# Patient Record
Sex: MALE | Race: WHITE | Employment: FULL TIME | URBAN - NONMETROPOLITAN AREA
[De-identification: names, ages, dates, MRNs, and addresses within clinical notes are randomized per-mention and may not be internally consistent; named-entity substitution may affect disease eponyms.]

---

## 2017-03-13 ENCOUNTER — OFFICE VISIT (OUTPATIENT)
Dept: FAMILY MEDICINE CLINIC | Age: 52
End: 2017-03-13

## 2017-03-13 VITALS
HEART RATE: 71 BPM | OXYGEN SATURATION: 96 % | WEIGHT: 226 LBS | DIASTOLIC BLOOD PRESSURE: 80 MMHG | TEMPERATURE: 97.2 F | BODY MASS INDEX: 28.1 KG/M2 | SYSTOLIC BLOOD PRESSURE: 122 MMHG | HEIGHT: 75 IN

## 2017-03-13 DIAGNOSIS — H10.32 ACUTE BACTERIAL CONJUNCTIVITIS OF LEFT EYE: ICD-10-CM

## 2017-03-13 DIAGNOSIS — Z12.11 COLON CANCER SCREENING: ICD-10-CM

## 2017-03-13 DIAGNOSIS — Z00.00 ENCOUNTER FOR PREVENTIVE HEALTH EXAMINATION: ICD-10-CM

## 2017-03-13 DIAGNOSIS — J06.9 UPPER RESPIRATORY TRACT INFECTION, UNSPECIFIED TYPE: Primary | ICD-10-CM

## 2017-03-13 DIAGNOSIS — Z13.220 LIPID SCREENING: ICD-10-CM

## 2017-03-13 DIAGNOSIS — Z12.5 PROSTATE CANCER SCREENING: ICD-10-CM

## 2017-03-13 PROCEDURE — 99203 OFFICE O/P NEW LOW 30 MIN: CPT | Performed by: NURSE PRACTITIONER

## 2017-03-13 RX ORDER — POLYMYXIN B SULFATE AND TRIMETHOPRIM 1; 10000 MG/ML; [USP'U]/ML
1 SOLUTION OPHTHALMIC EVERY 6 HOURS
Qty: 1 BOTTLE | Refills: 0 | Status: SHIPPED | OUTPATIENT
Start: 2017-03-13 | End: 2017-03-23

## 2017-03-13 RX ORDER — AZITHROMYCIN 250 MG/1
TABLET, FILM COATED ORAL
Qty: 1 PACKET | Refills: 0 | Status: ON HOLD | OUTPATIENT
Start: 2017-03-13 | End: 2017-05-15 | Stop reason: ALTCHOICE

## 2017-03-13 ASSESSMENT — ENCOUNTER SYMPTOMS
EYE DISCHARGE: 1
SHORTNESS OF BREATH: 0
EYE ITCHING: 0
SORE THROAT: 1
EYE REDNESS: 1
EYE PAIN: 0
COUGH: 1
PHOTOPHOBIA: 0

## 2017-04-07 DIAGNOSIS — Z00.00 ENCOUNTER FOR PREVENTIVE HEALTH EXAMINATION: ICD-10-CM

## 2017-04-07 DIAGNOSIS — Z12.5 PROSTATE CANCER SCREENING: ICD-10-CM

## 2017-04-07 DIAGNOSIS — Z13.220 LIPID SCREENING: ICD-10-CM

## 2017-04-07 LAB
ALBUMIN SERPL-MCNC: 4.4 G/DL (ref 3.9–4.9)
ALP BLD-CCNC: 38 U/L (ref 35–104)
ALT SERPL-CCNC: 36 U/L (ref 0–41)
ANION GAP SERPL CALCULATED.3IONS-SCNC: 12 MEQ/L (ref 7–13)
AST SERPL-CCNC: 25 U/L (ref 0–40)
BILIRUB SERPL-MCNC: 0.7 MG/DL (ref 0–1.2)
BUN BLDV-MCNC: 19 MG/DL (ref 6–20)
CALCIUM SERPL-MCNC: 10 MG/DL (ref 8.6–10.2)
CHLORIDE BLD-SCNC: 103 MEQ/L (ref 98–107)
CHOLESTEROL, TOTAL: 196 MG/DL (ref 0–199)
CO2: 25 MEQ/L (ref 22–29)
CREAT SERPL-MCNC: 0.97 MG/DL (ref 0.7–1.2)
GFR AFRICAN AMERICAN: >60
GFR NON-AFRICAN AMERICAN: >60
GLOBULIN: 2.4 G/DL (ref 2.3–3.5)
GLUCOSE BLD-MCNC: 86 MG/DL (ref 74–109)
HCT VFR BLD CALC: 45.1 % (ref 42–52)
HDLC SERPL-MCNC: 39 MG/DL (ref 40–59)
HEMOGLOBIN: 16 G/DL (ref 14–18)
LDL CHOLESTEROL CALCULATED: 100 MG/DL (ref 0–129)
MCH RBC QN AUTO: 33.5 PG (ref 27–31.3)
MCHC RBC AUTO-ENTMCNC: 35.5 % (ref 33–37)
MCV RBC AUTO: 94.3 FL (ref 80–100)
PDW BLD-RTO: 12.4 % (ref 11.5–14.5)
PLATELET # BLD: 192 K/UL (ref 130–400)
POTASSIUM SERPL-SCNC: 4.8 MEQ/L (ref 3.5–5.1)
PROSTATE SPECIFIC ANTIGEN: 1.6 NG/ML (ref 0–3.89)
RBC # BLD: 4.78 M/UL (ref 4.7–6.1)
SODIUM BLD-SCNC: 140 MEQ/L (ref 132–144)
TOTAL PROTEIN: 6.8 G/DL (ref 6.4–8.1)
TRIGL SERPL-MCNC: 284 MG/DL (ref 0–200)
WBC # BLD: 6.6 K/UL (ref 4.8–10.8)

## 2017-05-15 ENCOUNTER — HOSPITAL ENCOUNTER (OUTPATIENT)
Age: 52
Setting detail: OUTPATIENT SURGERY
Discharge: HOME | End: 2017-05-15
Attending: INTERNAL MEDICINE | Admitting: INTERNAL MEDICINE
Payer: COMMERCIAL

## 2017-05-15 ENCOUNTER — OFFICE VISIT (OUTPATIENT)
Dept: GASTROENTEROLOGY | Age: 52
End: 2017-05-15

## 2017-05-15 ENCOUNTER — ANESTHESIA EVENT (OUTPATIENT)
Dept: ENDOSCOPY | Age: 52
End: 2017-05-15
Payer: COMMERCIAL

## 2017-05-15 ENCOUNTER — ANESTHESIA (OUTPATIENT)
Dept: ENDOSCOPY | Age: 52
End: 2017-05-15
Payer: COMMERCIAL

## 2017-05-15 VITALS
RESPIRATION RATE: 18 BRPM | DIASTOLIC BLOOD PRESSURE: 75 MMHG | OXYGEN SATURATION: 96 % | SYSTOLIC BLOOD PRESSURE: 127 MMHG

## 2017-05-15 VITALS
DIASTOLIC BLOOD PRESSURE: 75 MMHG | BODY MASS INDEX: 28.23 KG/M2 | WEIGHT: 220 LBS | OXYGEN SATURATION: 64 % | HEIGHT: 74 IN | SYSTOLIC BLOOD PRESSURE: 131 MMHG | RESPIRATION RATE: 18 BRPM | TEMPERATURE: 97.8 F | HEART RATE: 66 BPM

## 2017-05-15 DIAGNOSIS — Z79.899 ENCOUNTER FOR LONG-TERM (CURRENT) USE OF MEDICATIONS: ICD-10-CM

## 2017-05-15 DIAGNOSIS — Z83.71 FAMILY HISTORY OF COLONIC POLYPS: Primary | ICD-10-CM

## 2017-05-15 PROCEDURE — 2500000003 HC RX 250 WO HCPCS: Performed by: NURSE ANESTHETIST, CERTIFIED REGISTERED

## 2017-05-15 PROCEDURE — 7100000011 HC PHASE II RECOVERY - ADDTL 15 MIN: Performed by: INTERNAL MEDICINE

## 2017-05-15 PROCEDURE — 3700000001 HC ADD 15 MINUTES (ANESTHESIA): Performed by: INTERNAL MEDICINE

## 2017-05-15 PROCEDURE — 2580000003 HC RX 258: Performed by: STUDENT IN AN ORGANIZED HEALTH CARE EDUCATION/TRAINING PROGRAM

## 2017-05-15 PROCEDURE — 7100000010 HC PHASE II RECOVERY - FIRST 15 MIN: Performed by: INTERNAL MEDICINE

## 2017-05-15 PROCEDURE — 3700000000 HC ANESTHESIA ATTENDED CARE: Performed by: INTERNAL MEDICINE

## 2017-05-15 PROCEDURE — 3609027000 HC COLONOSCOPY: Performed by: INTERNAL MEDICINE

## 2017-05-15 PROCEDURE — 6360000002 HC RX W HCPCS: Performed by: NURSE ANESTHETIST, CERTIFIED REGISTERED

## 2017-05-15 PROCEDURE — PREOPEXAM PRE-OP EXAM: Performed by: INTERNAL MEDICINE

## 2017-05-15 RX ORDER — SODIUM CHLORIDE 9 MG/ML
INJECTION, SOLUTION INTRAVENOUS CONTINUOUS
Status: DISCONTINUED | OUTPATIENT
Start: 2017-05-15 | End: 2017-05-15 | Stop reason: HOSPADM

## 2017-05-15 RX ORDER — LIDOCAINE HYDROCHLORIDE 20 MG/ML
INJECTION, SOLUTION INFILTRATION; PERINEURAL PRN
Status: DISCONTINUED | OUTPATIENT
Start: 2017-05-15 | End: 2017-05-15 | Stop reason: SDUPTHER

## 2017-05-15 RX ORDER — PROPOFOL 10 MG/ML
INJECTION, EMULSION INTRAVENOUS PRN
Status: DISCONTINUED | OUTPATIENT
Start: 2017-05-15 | End: 2017-05-15 | Stop reason: SDUPTHER

## 2017-05-15 RX ORDER — ONDANSETRON 2 MG/ML
4 INJECTION INTRAMUSCULAR; INTRAVENOUS
Status: DISCONTINUED | OUTPATIENT
Start: 2017-05-15 | End: 2017-05-15 | Stop reason: HOSPADM

## 2017-05-15 RX ADMIN — PROPOFOL 200 MG: 10 INJECTION, EMULSION INTRAVENOUS at 10:12

## 2017-05-15 RX ADMIN — PROPOFOL 100 MG: 10 INJECTION, EMULSION INTRAVENOUS at 10:20

## 2017-05-15 RX ADMIN — LIDOCAINE HYDROCHLORIDE 60 MG: 20 INJECTION, SOLUTION INFILTRATION; PERINEURAL at 10:12

## 2017-05-15 RX ADMIN — SODIUM CHLORIDE: 9 INJECTION, SOLUTION INTRAVENOUS at 09:17

## 2017-05-15 ASSESSMENT — PAIN - FUNCTIONAL ASSESSMENT: PAIN_FUNCTIONAL_ASSESSMENT: 0-10

## 2017-05-15 ASSESSMENT — PAIN DESCRIPTION - DESCRIPTORS: DESCRIPTORS: ACHING

## 2017-05-16 VITALS
HEART RATE: 74 BPM | SYSTOLIC BLOOD PRESSURE: 145 MMHG | WEIGHT: 220 LBS | DIASTOLIC BLOOD PRESSURE: 94 MMHG | BODY MASS INDEX: 28.25 KG/M2

## 2017-08-21 ENCOUNTER — OFFICE VISIT (OUTPATIENT)
Dept: SURGERY | Age: 52
End: 2017-08-21

## 2017-08-21 VITALS
HEIGHT: 75 IN | DIASTOLIC BLOOD PRESSURE: 78 MMHG | WEIGHT: 227 LBS | SYSTOLIC BLOOD PRESSURE: 122 MMHG | BODY MASS INDEX: 28.23 KG/M2 | TEMPERATURE: 97.8 F

## 2017-08-21 DIAGNOSIS — K61.1 PERIRECTAL ABSCESS: Primary | ICD-10-CM

## 2017-08-21 PROCEDURE — 99213 OFFICE O/P EST LOW 20 MIN: CPT | Performed by: SURGERY

## 2017-08-21 RX ORDER — AMOXICILLIN AND CLAVULANATE POTASSIUM 875; 125 MG/1; MG/1
1 TABLET, FILM COATED ORAL 2 TIMES DAILY
Qty: 28 TABLET | Refills: 1 | Status: SHIPPED | OUTPATIENT
Start: 2017-08-21 | End: 2017-09-04

## 2017-08-22 ENCOUNTER — APPOINTMENT (OUTPATIENT)
Dept: GENERAL RADIOLOGY | Age: 52
End: 2017-08-22
Payer: COMMERCIAL

## 2017-08-22 ENCOUNTER — HOSPITAL ENCOUNTER (EMERGENCY)
Age: 52
Discharge: HOME | End: 2017-08-22
Attending: EMERGENCY MEDICINE
Payer: COMMERCIAL

## 2017-08-22 VITALS
RESPIRATION RATE: 16 BRPM | HEART RATE: 85 BPM | OXYGEN SATURATION: 98 % | BODY MASS INDEX: 27.5 KG/M2 | SYSTOLIC BLOOD PRESSURE: 148 MMHG | DIASTOLIC BLOOD PRESSURE: 96 MMHG | WEIGHT: 220 LBS | TEMPERATURE: 98.4 F

## 2017-08-22 DIAGNOSIS — S86.012A ACHILLES TENDON RUPTURE, LEFT, INITIAL ENCOUNTER: Primary | ICD-10-CM

## 2017-08-22 PROCEDURE — 99283 EMERGENCY DEPT VISIT LOW MDM: CPT

## 2017-08-22 PROCEDURE — 73610 X-RAY EXAM OF ANKLE: CPT

## 2017-08-22 PROCEDURE — 6370000000 HC RX 637 (ALT 250 FOR IP): Performed by: EMERGENCY MEDICINE

## 2017-08-22 PROCEDURE — 73630 X-RAY EXAM OF FOOT: CPT

## 2017-08-22 RX ORDER — HYDROCODONE BITARTRATE AND ACETAMINOPHEN 5; 325 MG/1; MG/1
1 TABLET ORAL ONCE
Status: COMPLETED | OUTPATIENT
Start: 2017-08-22 | End: 2017-08-22

## 2017-08-22 RX ORDER — HYDROCODONE BITARTRATE AND ACETAMINOPHEN 5; 325 MG/1; MG/1
1 TABLET ORAL EVERY 6 HOURS PRN
Qty: 10 TABLET | Refills: 0 | Status: ON HOLD | OUTPATIENT
Start: 2017-08-22 | End: 2017-08-26 | Stop reason: HOSPADM

## 2017-08-22 RX ORDER — IBUPROFEN 600 MG/1
600 TABLET ORAL EVERY 6 HOURS PRN
Qty: 30 TABLET | Refills: 0 | Status: SHIPPED | OUTPATIENT
Start: 2017-08-22

## 2017-08-22 RX ADMIN — HYDROCODONE BITARTRATE AND ACETAMINOPHEN 1 TABLET: 5; 325 TABLET ORAL at 08:16

## 2017-08-22 ASSESSMENT — PAIN DESCRIPTION - LOCATION: LOCATION: ANKLE;LEG

## 2017-08-22 ASSESSMENT — PAIN DESCRIPTION - ONSET: ONSET: SUDDEN

## 2017-08-22 ASSESSMENT — ENCOUNTER SYMPTOMS: RESPIRATORY NEGATIVE: 1

## 2017-08-22 ASSESSMENT — PAIN DESCRIPTION - FREQUENCY: FREQUENCY: CONTINUOUS

## 2017-08-22 ASSESSMENT — PAIN DESCRIPTION - ORIENTATION: ORIENTATION: LEFT

## 2017-08-22 ASSESSMENT — PAIN SCALES - GENERAL
PAINLEVEL_OUTOF10: 5
PAINLEVEL_OUTOF10: 5

## 2017-08-22 ASSESSMENT — PAIN DESCRIPTION - DESCRIPTORS: DESCRIPTORS: THROBBING;BURNING

## 2017-08-22 ASSESSMENT — PAIN DESCRIPTION - PAIN TYPE: TYPE: ACUTE PAIN

## 2017-08-25 ENCOUNTER — HOSPITAL ENCOUNTER (OUTPATIENT)
Age: 52
Discharge: HOME | End: 2017-08-26
Attending: ORTHOPAEDIC SURGERY | Admitting: ORTHOPAEDIC SURGERY
Payer: COMMERCIAL

## 2017-08-25 ENCOUNTER — ANESTHESIA EVENT (OUTPATIENT)
Dept: OPERATING ROOM | Age: 52
End: 2017-08-25
Payer: COMMERCIAL

## 2017-08-25 ENCOUNTER — ANESTHESIA (OUTPATIENT)
Dept: OPERATING ROOM | Age: 52
End: 2017-08-25
Payer: COMMERCIAL

## 2017-08-25 VITALS — SYSTOLIC BLOOD PRESSURE: 104 MMHG | TEMPERATURE: 91.2 F | OXYGEN SATURATION: 100 % | DIASTOLIC BLOOD PRESSURE: 56 MMHG

## 2017-08-25 LAB
EKG ATRIAL RATE: 84 BPM
EKG P AXIS: 63 DEGREES
EKG P-R INTERVAL: 176 MS
EKG Q-T INTERVAL: 354 MS
EKG QRS DURATION: 76 MS
EKG QTC CALCULATION (BAZETT): 418 MS
EKG R AXIS: 28 DEGREES
EKG T AXIS: 37 DEGREES
EKG VENTRICULAR RATE: 84 BPM
HCT VFR BLD CALC: 47.3 % (ref 42–52)
HEMOGLOBIN: 16.1 G/DL (ref 14–18)
MCH RBC QN AUTO: 32.1 PG (ref 27–31.3)
MCHC RBC AUTO-ENTMCNC: 34 % (ref 33–37)
MCV RBC AUTO: 94.4 FL (ref 80–100)
PDW BLD-RTO: 12.2 % (ref 11.5–14.5)
PLATELET # BLD: 257 K/UL (ref 130–400)
RBC # BLD: 5.01 M/UL (ref 4.7–6.1)
WBC # BLD: 7.3 K/UL (ref 4.8–10.8)

## 2017-08-25 PROCEDURE — 6360000002 HC RX W HCPCS: Performed by: ORTHOPAEDIC SURGERY

## 2017-08-25 PROCEDURE — 2740000010 HC MISC ORTHOTIC: Performed by: ORTHOPAEDIC SURGERY

## 2017-08-25 PROCEDURE — 2580000003 HC RX 258: Performed by: STUDENT IN AN ORGANIZED HEALTH CARE EDUCATION/TRAINING PROGRAM

## 2017-08-25 PROCEDURE — 6360000002 HC RX W HCPCS: Performed by: ANESTHESIOLOGY

## 2017-08-25 PROCEDURE — 64447 NJX AA&/STRD FEMORAL NRV IMG: CPT | Performed by: NURSE ANESTHETIST, CERTIFIED REGISTERED

## 2017-08-25 PROCEDURE — 6360000002 HC RX W HCPCS: Performed by: NURSE ANESTHETIST, CERTIFIED REGISTERED

## 2017-08-25 PROCEDURE — 6370000000 HC RX 637 (ALT 250 FOR IP): Performed by: ORTHOPAEDIC SURGERY

## 2017-08-25 PROCEDURE — 85027 COMPLETE CBC AUTOMATED: CPT

## 2017-08-25 PROCEDURE — 2580000003 HC RX 258: Performed by: ORTHOPAEDIC SURGERY

## 2017-08-25 PROCEDURE — 7100000001 HC PACU RECOVERY - ADDTL 15 MIN: Performed by: ORTHOPAEDIC SURGERY

## 2017-08-25 PROCEDURE — 3600000003 HC SURGERY LEVEL 3 BASE: Performed by: ORTHOPAEDIC SURGERY

## 2017-08-25 PROCEDURE — 3700000000 HC ANESTHESIA ATTENDED CARE: Performed by: ORTHOPAEDIC SURGERY

## 2017-08-25 PROCEDURE — 3700000001 HC ADD 15 MINUTES (ANESTHESIA): Performed by: ORTHOPAEDIC SURGERY

## 2017-08-25 PROCEDURE — 64445 NJX AA&/STRD SCIATIC NRV IMG: CPT | Performed by: NURSE ANESTHETIST, CERTIFIED REGISTERED

## 2017-08-25 PROCEDURE — 7100000000 HC PACU RECOVERY - FIRST 15 MIN: Performed by: ORTHOPAEDIC SURGERY

## 2017-08-25 PROCEDURE — 3600000013 HC SURGERY LEVEL 3 ADDTL 15MIN: Performed by: ORTHOPAEDIC SURGERY

## 2017-08-25 PROCEDURE — 93005 ELECTROCARDIOGRAM TRACING: CPT

## 2017-08-25 PROCEDURE — 2500000003 HC RX 250 WO HCPCS: Performed by: NURSE ANESTHETIST, CERTIFIED REGISTERED

## 2017-08-25 PROCEDURE — 2500000003 HC RX 250 WO HCPCS: Performed by: STUDENT IN AN ORGANIZED HEALTH CARE EDUCATION/TRAINING PROGRAM

## 2017-08-25 RX ORDER — ACETAMINOPHEN 325 MG/1
650 TABLET ORAL EVERY 4 HOURS PRN
Status: DISCONTINUED | OUTPATIENT
Start: 2017-08-25 | End: 2017-08-26 | Stop reason: HOSPADM

## 2017-08-25 RX ORDER — MORPHINE SULFATE 2 MG/ML
2 INJECTION, SOLUTION INTRAMUSCULAR; INTRAVENOUS
Status: DISCONTINUED | OUTPATIENT
Start: 2017-08-25 | End: 2017-08-26 | Stop reason: HOSPADM

## 2017-08-25 RX ORDER — SODIUM CHLORIDE 0.9 % (FLUSH) 0.9 %
10 SYRINGE (ML) INJECTION PRN
Status: DISCONTINUED | OUTPATIENT
Start: 2017-08-25 | End: 2017-08-25 | Stop reason: HOSPADM

## 2017-08-25 RX ORDER — MAGNESIUM HYDROXIDE 1200 MG/15ML
LIQUID ORAL CONTINUOUS PRN
Status: DISCONTINUED | OUTPATIENT
Start: 2017-08-25 | End: 2017-08-25 | Stop reason: HOSPADM

## 2017-08-25 RX ORDER — ROCURONIUM BROMIDE 10 MG/ML
INJECTION, SOLUTION INTRAVENOUS PRN
Status: DISCONTINUED | OUTPATIENT
Start: 2017-08-25 | End: 2017-08-25 | Stop reason: SDUPTHER

## 2017-08-25 RX ORDER — ROPIVACAINE HYDROCHLORIDE 5 MG/ML
INJECTION, SOLUTION EPIDURAL; INFILTRATION; PERINEURAL PRN
Status: DISCONTINUED | OUTPATIENT
Start: 2017-08-25 | End: 2017-08-25 | Stop reason: SDUPTHER

## 2017-08-25 RX ORDER — HYDROCODONE BITARTRATE AND ACETAMINOPHEN 5; 325 MG/1; MG/1
1 TABLET ORAL PRN
Status: DISCONTINUED | OUTPATIENT
Start: 2017-08-25 | End: 2017-08-25 | Stop reason: HOSPADM

## 2017-08-25 RX ORDER — DEXAMETHASONE SODIUM PHOSPHATE 10 MG/ML
INJECTION INTRAMUSCULAR; INTRAVENOUS PRN
Status: DISCONTINUED | OUTPATIENT
Start: 2017-08-25 | End: 2017-08-25 | Stop reason: SDUPTHER

## 2017-08-25 RX ORDER — METOCLOPRAMIDE HYDROCHLORIDE 5 MG/ML
10 INJECTION INTRAMUSCULAR; INTRAVENOUS
Status: DISCONTINUED | OUTPATIENT
Start: 2017-08-25 | End: 2017-08-25 | Stop reason: HOSPADM

## 2017-08-25 RX ORDER — SODIUM CHLORIDE 0.9 % (FLUSH) 0.9 %
10 SYRINGE (ML) INJECTION EVERY 12 HOURS SCHEDULED
Status: DISCONTINUED | OUTPATIENT
Start: 2017-08-25 | End: 2017-08-25 | Stop reason: HOSPADM

## 2017-08-25 RX ORDER — DOCUSATE SODIUM 100 MG/1
100 CAPSULE, LIQUID FILLED ORAL 2 TIMES DAILY
Status: DISCONTINUED | OUTPATIENT
Start: 2017-08-25 | End: 2017-08-26 | Stop reason: HOSPADM

## 2017-08-25 RX ORDER — MORPHINE SULFATE 1 MG/ML
INJECTION, SOLUTION EPIDURAL; INTRATHECAL; INTRAVENOUS PRN
Status: DISCONTINUED | OUTPATIENT
Start: 2017-08-25 | End: 2017-08-25 | Stop reason: SDUPTHER

## 2017-08-25 RX ORDER — ONDANSETRON 2 MG/ML
INJECTION INTRAMUSCULAR; INTRAVENOUS PRN
Status: DISCONTINUED | OUTPATIENT
Start: 2017-08-25 | End: 2017-08-25 | Stop reason: SDUPTHER

## 2017-08-25 RX ORDER — EPHEDRINE SULFATE 50 MG/ML
INJECTION, SOLUTION INTRAVENOUS PRN
Status: DISCONTINUED | OUTPATIENT
Start: 2017-08-25 | End: 2017-08-25 | Stop reason: SDUPTHER

## 2017-08-25 RX ORDER — OXYCODONE HYDROCHLORIDE AND ACETAMINOPHEN 5; 325 MG/1; MG/1
2 TABLET ORAL EVERY 4 HOURS PRN
Status: DISCONTINUED | OUTPATIENT
Start: 2017-08-25 | End: 2017-08-26 | Stop reason: HOSPADM

## 2017-08-25 RX ORDER — FENTANYL CITRATE 50 UG/ML
50 INJECTION, SOLUTION INTRAMUSCULAR; INTRAVENOUS EVERY 10 MIN PRN
Status: DISCONTINUED | OUTPATIENT
Start: 2017-08-25 | End: 2017-08-25 | Stop reason: HOSPADM

## 2017-08-25 RX ORDER — SENNA AND DOCUSATE SODIUM 50; 8.6 MG/1; MG/1
1 TABLET, FILM COATED ORAL DAILY
Status: DISCONTINUED | OUTPATIENT
Start: 2017-08-25 | End: 2017-08-26 | Stop reason: HOSPADM

## 2017-08-25 RX ORDER — SODIUM CHLORIDE, SODIUM LACTATE, POTASSIUM CHLORIDE, CALCIUM CHLORIDE 600; 310; 30; 20 MG/100ML; MG/100ML; MG/100ML; MG/100ML
INJECTION, SOLUTION INTRAVENOUS CONTINUOUS
Status: DISCONTINUED | OUTPATIENT
Start: 2017-08-25 | End: 2017-08-25 | Stop reason: SDUPTHER

## 2017-08-25 RX ORDER — HYDROCODONE BITARTRATE AND ACETAMINOPHEN 5; 325 MG/1; MG/1
2 TABLET ORAL PRN
Status: DISCONTINUED | OUTPATIENT
Start: 2017-08-25 | End: 2017-08-25 | Stop reason: HOSPADM

## 2017-08-25 RX ORDER — ZOLPIDEM TARTRATE 5 MG/1
5 TABLET ORAL NIGHTLY PRN
Status: DISCONTINUED | OUTPATIENT
Start: 2017-08-26 | End: 2017-08-26 | Stop reason: HOSPADM

## 2017-08-25 RX ORDER — ONDANSETRON 2 MG/ML
4 INJECTION INTRAMUSCULAR; INTRAVENOUS
Status: DISCONTINUED | OUTPATIENT
Start: 2017-08-25 | End: 2017-08-25 | Stop reason: HOSPADM

## 2017-08-25 RX ORDER — LIDOCAINE HYDROCHLORIDE 10 MG/ML
1 INJECTION, SOLUTION EPIDURAL; INFILTRATION; INTRACAUDAL; PERINEURAL
Status: DISCONTINUED | OUTPATIENT
Start: 2017-08-25 | End: 2017-08-25 | Stop reason: SDUPTHER

## 2017-08-25 RX ORDER — MIDAZOLAM HYDROCHLORIDE 1 MG/ML
INJECTION INTRAMUSCULAR; INTRAVENOUS PRN
Status: DISCONTINUED | OUTPATIENT
Start: 2017-08-25 | End: 2017-08-25 | Stop reason: SDUPTHER

## 2017-08-25 RX ORDER — OXYCODONE HYDROCHLORIDE AND ACETAMINOPHEN 5; 325 MG/1; MG/1
1 TABLET ORAL EVERY 4 HOURS PRN
Status: DISCONTINUED | OUTPATIENT
Start: 2017-08-25 | End: 2017-08-26 | Stop reason: HOSPADM

## 2017-08-25 RX ORDER — SODIUM CHLORIDE 9 MG/ML
INJECTION, SOLUTION INTRAVENOUS CONTINUOUS
Status: DISCONTINUED | OUTPATIENT
Start: 2017-08-25 | End: 2017-08-26 | Stop reason: HOSPADM

## 2017-08-25 RX ORDER — DIPHENHYDRAMINE HYDROCHLORIDE 50 MG/ML
12.5 INJECTION INTRAMUSCULAR; INTRAVENOUS
Status: DISCONTINUED | OUTPATIENT
Start: 2017-08-25 | End: 2017-08-25 | Stop reason: HOSPADM

## 2017-08-25 RX ORDER — KETOROLAC TROMETHAMINE 30 MG/ML
30 INJECTION, SOLUTION INTRAMUSCULAR; INTRAVENOUS EVERY 6 HOURS
Status: COMPLETED | OUTPATIENT
Start: 2017-08-25 | End: 2017-08-26

## 2017-08-25 RX ORDER — SODIUM CHLORIDE 0.9 % (FLUSH) 0.9 %
10 SYRINGE (ML) INJECTION PRN
Status: DISCONTINUED | OUTPATIENT
Start: 2017-08-25 | End: 2017-08-26 | Stop reason: HOSPADM

## 2017-08-25 RX ORDER — MEPERIDINE HYDROCHLORIDE 25 MG/ML
12.5 INJECTION INTRAMUSCULAR; INTRAVENOUS; SUBCUTANEOUS EVERY 5 MIN PRN
Status: DISCONTINUED | OUTPATIENT
Start: 2017-08-25 | End: 2017-08-25 | Stop reason: HOSPADM

## 2017-08-25 RX ORDER — LIDOCAINE HYDROCHLORIDE 20 MG/ML
INJECTION, SOLUTION EPIDURAL; INFILTRATION; INTRACAUDAL; PERINEURAL PRN
Status: DISCONTINUED | OUTPATIENT
Start: 2017-08-25 | End: 2017-08-25 | Stop reason: SDUPTHER

## 2017-08-25 RX ORDER — PROPOFOL 10 MG/ML
INJECTION, EMULSION INTRAVENOUS PRN
Status: DISCONTINUED | OUTPATIENT
Start: 2017-08-25 | End: 2017-08-25 | Stop reason: SDUPTHER

## 2017-08-25 RX ORDER — LIDOCAINE HYDROCHLORIDE 10 MG/ML
1 INJECTION, SOLUTION EPIDURAL; INFILTRATION; INTRACAUDAL; PERINEURAL
Status: COMPLETED | OUTPATIENT
Start: 2017-08-25 | End: 2017-08-25

## 2017-08-25 RX ORDER — SODIUM CHLORIDE 0.9 % (FLUSH) 0.9 %
10 SYRINGE (ML) INJECTION PRN
Status: DISCONTINUED | OUTPATIENT
Start: 2017-08-25 | End: 2017-08-25 | Stop reason: ALTCHOICE

## 2017-08-25 RX ORDER — MORPHINE SULFATE 4 MG/ML
4 INJECTION, SOLUTION INTRAMUSCULAR; INTRAVENOUS
Status: DISCONTINUED | OUTPATIENT
Start: 2017-08-25 | End: 2017-08-26 | Stop reason: HOSPADM

## 2017-08-25 RX ORDER — SODIUM CHLORIDE, SODIUM LACTATE, POTASSIUM CHLORIDE, CALCIUM CHLORIDE 600; 310; 30; 20 MG/100ML; MG/100ML; MG/100ML; MG/100ML
INJECTION, SOLUTION INTRAVENOUS CONTINUOUS
Status: DISCONTINUED | OUTPATIENT
Start: 2017-08-25 | End: 2017-08-25

## 2017-08-25 RX ORDER — ONDANSETRON 2 MG/ML
4 INJECTION INTRAMUSCULAR; INTRAVENOUS EVERY 6 HOURS PRN
Status: DISCONTINUED | OUTPATIENT
Start: 2017-08-25 | End: 2017-08-26 | Stop reason: HOSPADM

## 2017-08-25 RX ORDER — SODIUM CHLORIDE 0.9 % (FLUSH) 0.9 %
10 SYRINGE (ML) INJECTION EVERY 12 HOURS SCHEDULED
Status: DISCONTINUED | OUTPATIENT
Start: 2017-08-25 | End: 2017-08-26 | Stop reason: HOSPADM

## 2017-08-25 RX ORDER — SODIUM CHLORIDE 0.9 % (FLUSH) 0.9 %
10 SYRINGE (ML) INJECTION EVERY 12 HOURS SCHEDULED
Status: DISCONTINUED | OUTPATIENT
Start: 2017-08-25 | End: 2017-08-25 | Stop reason: SDUPTHER

## 2017-08-25 RX ADMIN — LIDOCAINE HYDROCHLORIDE 0.1 ML: 10 INJECTION, SOLUTION EPIDURAL; INFILTRATION; INTRACAUDAL; PERINEURAL at 13:13

## 2017-08-25 RX ADMIN — ROPIVACAINE HYDROCHLORIDE 30 ML: 5 INJECTION, SOLUTION EPIDURAL; INFILTRATION; PERINEURAL at 13:20

## 2017-08-25 RX ADMIN — SUGAMMADEX 394 MG: 100 INJECTION, SOLUTION INTRAVENOUS at 16:18

## 2017-08-25 RX ADMIN — SENNOSIDES AND DOCUSATE SODIUM 1 TABLET: 8.6; 5 TABLET ORAL at 21:02

## 2017-08-25 RX ADMIN — DOCUSATE SODIUM 100 MG: 100 CAPSULE, LIQUID FILLED ORAL at 21:03

## 2017-08-25 RX ADMIN — EPHEDRINE SULFATE 5 MG: 50 INJECTION, SOLUTION INTRAMUSCULAR; INTRAVENOUS; SUBCUTANEOUS at 15:57

## 2017-08-25 RX ADMIN — MORPHINE SULFATE 4.2 MG: 1 INJECTION EPIDURAL; INTRATHECAL; INTRAVENOUS at 13:20

## 2017-08-25 RX ADMIN — CEFAZOLIN SODIUM 2 G: 2 SOLUTION INTRAVENOUS at 15:15

## 2017-08-25 RX ADMIN — PROPOFOL 200 MG: 10 INJECTION, EMULSION INTRAVENOUS at 15:11

## 2017-08-25 RX ADMIN — ONDANSETRON 4 MG: 2 INJECTION INTRAMUSCULAR; INTRAVENOUS at 16:00

## 2017-08-25 RX ADMIN — SODIUM CHLORIDE, POTASSIUM CHLORIDE, SODIUM LACTATE AND CALCIUM CHLORIDE: 600; 310; 30; 20 INJECTION, SOLUTION INTRAVENOUS at 13:14

## 2017-08-25 RX ADMIN — MIDAZOLAM HYDROCHLORIDE 2 MG: 1 INJECTION, SOLUTION INTRAMUSCULAR; INTRAVENOUS at 13:15

## 2017-08-25 RX ADMIN — DEXAMETHASONE SODIUM PHOSPHATE 10 MG: 10 INJECTION INTRAMUSCULAR; INTRAVENOUS at 15:43

## 2017-08-25 RX ADMIN — SODIUM CHLORIDE, POTASSIUM CHLORIDE, SODIUM LACTATE AND CALCIUM CHLORIDE: 600; 310; 30; 20 INJECTION, SOLUTION INTRAVENOUS at 15:43

## 2017-08-25 RX ADMIN — FENTANYL CITRATE 100 MCG: 50 INJECTION, SOLUTION INTRAMUSCULAR; INTRAVENOUS at 13:15

## 2017-08-25 RX ADMIN — ROCURONIUM BROMIDE 50 MG: 10 INJECTION INTRAVENOUS at 15:11

## 2017-08-25 RX ADMIN — FENTANYL CITRATE 100 MCG: 50 INJECTION, SOLUTION INTRAMUSCULAR; INTRAVENOUS at 15:11

## 2017-08-25 RX ADMIN — SODIUM CHLORIDE, PRESERVATIVE FREE 10 ML: 5 INJECTION INTRAVENOUS at 21:04

## 2017-08-25 RX ADMIN — ROPIVACAINE HYDROCHLORIDE 30 ML: 5 INJECTION, SOLUTION EPIDURAL; INFILTRATION; PERINEURAL at 13:25

## 2017-08-25 RX ADMIN — LIDOCAINE HYDROCHLORIDE 60 MG: 20 INJECTION, SOLUTION EPIDURAL; INFILTRATION; INTRACAUDAL; PERINEURAL at 15:11

## 2017-08-25 RX ADMIN — MIDAZOLAM HYDROCHLORIDE 2 MG: 1 INJECTION, SOLUTION INTRAMUSCULAR; INTRAVENOUS at 15:06

## 2017-08-25 RX ADMIN — SODIUM CHLORIDE: 9 INJECTION, SOLUTION INTRAVENOUS at 21:04

## 2017-08-25 RX ADMIN — KETOROLAC TROMETHAMINE 30 MG: 30 INJECTION, SOLUTION INTRAMUSCULAR at 21:03

## 2017-08-25 ASSESSMENT — PAIN SCALES - GENERAL
PAINLEVEL_OUTOF10: 0
PAINLEVEL_OUTOF10: 0

## 2017-08-25 ASSESSMENT — PAIN - FUNCTIONAL ASSESSMENT: PAIN_FUNCTIONAL_ASSESSMENT: 0-10

## 2017-08-26 VITALS
WEIGHT: 217 LBS | OXYGEN SATURATION: 94 % | DIASTOLIC BLOOD PRESSURE: 51 MMHG | HEART RATE: 91 BPM | BODY MASS INDEX: 26.98 KG/M2 | HEIGHT: 75 IN | RESPIRATION RATE: 18 BRPM | SYSTOLIC BLOOD PRESSURE: 99 MMHG | TEMPERATURE: 98.6 F

## 2017-08-26 PROCEDURE — 2700000000 HC OXYGEN THERAPY PER DAY

## 2017-08-26 PROCEDURE — 6360000002 HC RX W HCPCS: Performed by: NURSE PRACTITIONER

## 2017-08-26 PROCEDURE — G8979 MOBILITY GOAL STATUS: HCPCS

## 2017-08-26 PROCEDURE — 6360000002 HC RX W HCPCS: Performed by: ORTHOPAEDIC SURGERY

## 2017-08-26 PROCEDURE — G8978 MOBILITY CURRENT STATUS: HCPCS

## 2017-08-26 PROCEDURE — G8980 MOBILITY D/C STATUS: HCPCS

## 2017-08-26 PROCEDURE — 97161 PT EVAL LOW COMPLEX 20 MIN: CPT

## 2017-08-26 PROCEDURE — 6370000000 HC RX 637 (ALT 250 FOR IP): Performed by: ORTHOPAEDIC SURGERY

## 2017-08-26 RX ORDER — KETOROLAC TROMETHAMINE 30 MG/ML
30 INJECTION, SOLUTION INTRAMUSCULAR; INTRAVENOUS ONCE
Status: COMPLETED | OUTPATIENT
Start: 2017-08-26 | End: 2017-08-26

## 2017-08-26 RX ORDER — FAMOTIDINE 10 MG
10 TABLET ORAL DAILY
COMMUNITY

## 2017-08-26 RX ORDER — OXYCODONE HYDROCHLORIDE AND ACETAMINOPHEN 5; 325 MG/1; MG/1
1 TABLET ORAL EVERY 4 HOURS PRN
Qty: 40 TABLET | Refills: 0 | Status: SHIPPED | OUTPATIENT
Start: 2017-08-26 | End: 2017-09-02

## 2017-08-26 RX ADMIN — SENNOSIDES AND DOCUSATE SODIUM 1 TABLET: 8.6; 5 TABLET ORAL at 09:03

## 2017-08-26 RX ADMIN — KETOROLAC TROMETHAMINE 30 MG: 30 INJECTION, SOLUTION INTRAMUSCULAR at 09:03

## 2017-08-26 RX ADMIN — CEFAZOLIN SODIUM 2 G: 2 SOLUTION INTRAVENOUS at 00:22

## 2017-08-26 RX ADMIN — DOCUSATE SODIUM 100 MG: 100 CAPSULE, LIQUID FILLED ORAL at 09:03

## 2017-08-26 RX ADMIN — CEFAZOLIN SODIUM 2 G: 2 SOLUTION INTRAVENOUS at 06:50

## 2017-08-26 RX ADMIN — KETOROLAC TROMETHAMINE 30 MG: 30 INJECTION, SOLUTION INTRAMUSCULAR at 03:06

## 2017-08-26 RX ADMIN — OXYCODONE HYDROCHLORIDE AND ACETAMINOPHEN 2 TABLET: 5; 325 TABLET ORAL at 13:13

## 2017-08-26 ASSESSMENT — PAIN DESCRIPTION - LOCATION: LOCATION: ANKLE;FOOT

## 2017-08-26 ASSESSMENT — PAIN DESCRIPTION - ORIENTATION: ORIENTATION: LEFT

## 2017-08-26 ASSESSMENT — PAIN SCALES - GENERAL
PAINLEVEL_OUTOF10: 7
PAINLEVEL_OUTOF10: 6
PAINLEVEL_OUTOF10: 0
PAINLEVEL_OUTOF10: 0

## 2017-08-26 ASSESSMENT — PAIN DESCRIPTION - PAIN TYPE: TYPE: ACUTE PAIN

## 2017-08-26 ASSESSMENT — PAIN DESCRIPTION - DESCRIPTORS: DESCRIPTORS: BURNING

## 2017-09-25 ENCOUNTER — OFFICE VISIT (OUTPATIENT)
Dept: SURGERY | Age: 52
End: 2017-09-25

## 2017-09-25 VITALS
HEIGHT: 75 IN | SYSTOLIC BLOOD PRESSURE: 132 MMHG | TEMPERATURE: 97.7 F | DIASTOLIC BLOOD PRESSURE: 78 MMHG | WEIGHT: 228 LBS | BODY MASS INDEX: 28.35 KG/M2

## 2017-09-25 DIAGNOSIS — K61.1 PERIRECTAL ABSCESS: Primary | ICD-10-CM

## 2017-09-25 PROCEDURE — 99212 OFFICE O/P EST SF 10 MIN: CPT | Performed by: SURGERY

## 2017-10-06 ENCOUNTER — HOSPITAL ENCOUNTER (OUTPATIENT)
Dept: PHYSICAL THERAPY | Age: 52
Setting detail: THERAPIES SERIES
Discharge: HOME | End: 2017-10-06
Payer: COMMERCIAL

## 2017-10-06 PROCEDURE — 97110 THERAPEUTIC EXERCISES: CPT

## 2017-10-06 PROCEDURE — 97162 PT EVAL MOD COMPLEX 30 MIN: CPT

## 2017-10-06 NOTE — PLAN OF CARE
[address]     Ph: [phone]  Fax: [phone]    [] Certification  [] Recertification [x]  Plan of Care  [] Progress Note [] Discharge      To:  Referring Practitioner: Dr. Aj Hahn      From:  Aren Jeronimo, PT  Patient: Rena Mei     : 1965  Diagnosis: Achilles tendon rupture      Date: 10/6/2017     OBJECTIVE:   Assessment: Pt presents s/p L achilles tendon repair, WBAT with walking boot. Pt unable to amb  without boot until clearance from MD, no dorsiflexion past neutral until next follow up. Pt demonstrates impaired L ankle ROM and mobility, with increased difficulty and pain when traveling for work.  Pt would benefit from the ongoing care of skilled PT to restrore strength, ROM, and mobility    Short Term Goals - Time Frame for Short term goals: 5 weeks (ROM, strength, gait, balance goals in effect when clearance provided from MD)    Goals Current/Discharge status  Met   Short term goal 1: Pt will be indep with HEP for ROM   Pt needs further instruction and education      [] yes  [x] no   Short term goal 2: Pt will demonstrate L foot/ankle motion that is [facility] [place] and pain free   AROM LLE (degrees)  LLE General AROM: PF 40*, inv 19*, ev 4* (No DF past neutral until after pt follow up on 10/26)      [] yes  [x] no   Short term goal 3: Pt will complete 5 L SL heel raises indep    [] yes  [] no   Short term goal 4: LEFS will improve by 15 or more points to demonstrate improved lower extremity function   [] yes  [] no   Short term goal 5: Pt will demonstrate at least 4/5 strength in L foot/ankle   []  yes  []  no     Long Term Goals -    Goals Current/ Discharge status Met   Long term goal 1: Pt will amb .5 mile without AD or boot with smooth, reciprocal pattern without incraesed in pain amb with walking boot with increased pain  [] yes  [x] no   Long term goal 2: Pt will maintain L SL stance

## 2017-10-06 NOTE — PROGRESS NOTES
[address]  PHYSICAL THERAPY EVALUATION    Date: 10/6/2017  Patient Name: Marcelo Moore       MRN: [id number]   Account: [de-identified]   : 1965  (46 y.o.)   Gender: male   Referring Practitioner: Dr. Anjel Desir                 Diagnosis: Achilles tendon rupture                    Past Medical History:  has a past medical history of C6 radiculopathy; GERD (gastroesophageal reflux disease); LBP (low back pain); Osteoarthritis; and Rotator cuff impingement syndrome. Past Surgical History:   has a past surgical history that includes Rotator cuff repair (4 years ago); other surgical history (12); other surgical history (12); Leg Surgery (); Elbow Arthroplasty; knee surgery; Vasectomy (); Colonoscopy; fracture surgery (Right); colon ca scrn not hi rsk ind (N/A, 5/15/2017); and length/short leg/ankl tendon,single (Left, 2017). Vital Signs  Patient Currently in Pain: Yes   Pain Screening  Patient Currently in Pain: Yes                Lives With: Spouse  Type of Home: House  Home Layout: Two level  Home Access: Stairs to enter without rails  Entrance Stairs - Number of Steps: 2  Bathroom Shower/Tub: Tub/Shower unit  Home Equipment: Crutches  ADL Assistance: Independent  Homemaking Assistance: Independent  Homemaking Responsibilities: Yes  Ambulation Assistance: Independent  Transfer Assistance: Independent  Active : Yes  Occupation: Full time employment  Type of occupation: : on feet most of the time, travels frequently         Subjective:  Subjective: Pt presents s/p L achillies tendon repair on 17. Pt ruptured L margarito playing basketball. Was casted, now wearing boot. has to wear boot for next 2 months. Pt notes surgical area is healing well with minimal swelling and no pain. Pt however reports nerve live pain along lateral aspect of foot that is at least 7/10.  States boot or other objects brushing against distal lower leg by tibial nerve can \"trigger\" this pain, which wakes him at night. Reports impaired sensation along lateral foot, and hypersensitivity to touch. States Dr. Declan Lainez is aware and said to wait at least 3 months before worrying about it, although Pt is somewhat concerned. Pt works full-time and travels frequently, walks between 6-9,000 steps per day. Reports heel lift is very hard and uncomfortable. Pt has f/u 10/26, states he will get heel lift decreased, but will still have to wear boot at least another month   Comments: Per script: s/p L achlles repair. WBAT in walking boot with lift. No DF past neutral for 4 weeks (10/23). No barefoot walking. F/u with Dr. Declan Lainez 10/26    Objective:        Balance  Comments: NT d/t walking boot and no barefoot walking orders, Pt would benefit from balance assessment when appropriate         Ambulation 1  Device: No Device  Other Apparatus:  (walkin boot)  Quality of Gait: Reciprocal pattern witrh timely pace, no device with walking boot            Strength RLE  Strength RLE: WNL  Strength LLE  Strength LLE: WNL  Comment: ankle NT                AROM RLE (degrees)  RLE General AROM: ankle: df 5*, PF 50*, inv 30*, ev 22*     AROM LLE (degrees)  LLE General AROM: PF 40*, inv 19*, ev 4* (No DF past neutral until after pt follow up on 10/26)        Additional Measures  Other: LEFS: 24/80         Exercises:   Exercises  Exercise 1: ankle AROM: PF, inv, ev to erna  Exercise 20: HEP: ankle AROM PF, inv, ev  Modalities:  Modalities  Moist heat: *  Cryotherapy (Minutes\Location): *  Manual:  Manual therapy  PROM: *  Muscle energy: *  Soft Tissue Mobalization: *  *Indicates exercise,modality, or manual techniques to be initiated when appropriate  Assessment:   Body structures, Functions, Activity limitations: Decreased functional mobility , Decreased ROM, Decreased strength, Decreased balance, Decreased high-level IADLs  Assessment: Pt presents s/p L achilles tendon repair, WBAT with walking standard prevention interventions Moderate =  Score of 24-44   [] Discuss fall prevention strategies   [] Indicate moderate falls risk on eval  []  Use high risk prevention interventions High = Score of 45 and higher   [] Discuss fall prevention strategies   [] Provide supervision during treatment time    Goals  Short term goals  Time Frame for Short term goals: 5 weeks (ROM, strength, gait, balance goals in effect when clearance provided from MD)  Short term goal 1: Pt will be indep with HEP for ROM   Short term goal 2: Pt will demonstrate L foot/ankle motion that is [facility] and pain free   Short term goal 3: Pt will complete 5 L SL heel raises indep   Short term goal 4: LEFS will improve by 15 or more points to demonstrate improved lower extremity function  Short term goal 5: Pt will demonstrate at least 4/5 strength in L foot/ankle   Long term goals  Long term goal 1: Pt will amb .5 mile without AD or boot with smooth, reciprocal pattern without incraesed in pain  Long term goal 2: Pt will maintain L SL stance for 30 seconds on level and compliant surface indep         PT Individual Minutes  Time In: 0900  Time Out: 0949  Minutes: 49  Timed Code Treatment Minutes: 10 Minutes  Procedure Minutes:59  Electronically signed by Sindhu Doherty PT on 10/6/17 at 10:09 AM

## 2017-10-19 ENCOUNTER — HOSPITAL ENCOUNTER (OUTPATIENT)
Dept: PHYSICAL THERAPY | Age: 52
Setting detail: THERAPIES SERIES
Discharge: HOME | End: 2017-10-19
Payer: COMMERCIAL

## 2017-10-19 PROCEDURE — 97110 THERAPEUTIC EXERCISES: CPT

## 2017-10-19 PROCEDURE — 97140 MANUAL THERAPY 1/> REGIONS: CPT

## 2017-10-19 NOTE — PROGRESS NOTES
[address] [address]  Outpatient Physical Therapy    Treatment Note        Date: 10/19/2017  Patient: Monique Kaur  : 1965  ACCT #: [de-identified]  Referring Practitioner: Dr. Arnold Hines  Diagnosis: Achilles tendon rupture     Visit Information:  PT Visit Information  PT Insurance Information: [employer] P[employer]  Total # of Visits to Date: 2  Progress Note Counter: 2/10    Subjective: Pt requesting good HEP to perform until able to perform more more exs; Pt reports he feels the nerves on the ousidt of the ( lat malleolus ) to side of the foot - has consistent nerve pain- feels like a layer of skin has been shaved off huber at night. Comments: Per script: s/p L achlles repair. WBAT in walking boot with lift. No DF past neutral for 4 weeks (10/23). No barefoot walking. F/u with Dr. Vivienne Hoyos 10/26  HEP Compliance:  [x] Good [] Fair [] Poor [] Reports not doing due to:    Vital Signs  Patient Currently in Pain: Denies (if nothing is touching it)   Pain Screening  Patient Currently in Pain: Denies (if nothing is touching it)    OBJECTIVE:   Exercises  Exercise 2: Towel scrunches x 2 mins   Exercise 3: [place]  x 2 min  Exercise 4: Ankle TB w/ 3 s eccentric  PF/ EV/ INV : YTB x 10  Exercise 19: HEP towel scrunches, TB ankle pF. EV, INV             Manual:   Manual therapy  Joint mobilization: P<-->A mobs of metetarsals  Soft Tissue Mobalization: STM, TPR to proximal 1/2 of  L gastroc / soleus complex to dec soreness,; minimal long strokes moving proximal to distal only; STM L long arch of foot; total time 22    Modalities:        *Indicates exercise, modality, or manual techniques to be initiated when appropriate    Assessment:         Body structures, Functions, Activity limitations: Decreased functional mobility , Decreased ROM, Decreased strength, Decreased balance, Decreased high-level IADLs  Assessment: Discussed IDN w/ pt who will discuss w/ MD upon RTD 10-26-17; Pt reports will be out of town next Electronically signed by Katelyn King PT on 10/19/17 at 9:49 AM

## 2017-10-30 ENCOUNTER — HOSPITAL ENCOUNTER (OUTPATIENT)
Dept: PHYSICAL THERAPY | Age: 52
Setting detail: THERAPIES SERIES
Discharge: HOME | End: 2017-10-30
Payer: COMMERCIAL

## 2017-10-30 PROCEDURE — 97110 THERAPEUTIC EXERCISES: CPT

## 2017-10-30 PROCEDURE — 97140 MANUAL THERAPY 1/> REGIONS: CPT

## 2017-10-30 ASSESSMENT — PAIN DESCRIPTION - PAIN TYPE: TYPE: NEUROPATHIC PAIN

## 2017-10-30 ASSESSMENT — PAIN DESCRIPTION - ORIENTATION: ORIENTATION: LEFT;OUTER

## 2017-10-30 ASSESSMENT — PAIN DESCRIPTION - DESCRIPTORS: DESCRIPTORS: NUMBNESS;TINGLING

## 2017-10-30 ASSESSMENT — PAIN DESCRIPTION - FREQUENCY: FREQUENCY: CONTINUOUS

## 2017-10-30 ASSESSMENT — PAIN SCALES - GENERAL: PAINLEVEL_OUTOF10: 2

## 2017-10-30 ASSESSMENT — PAIN DESCRIPTION - LOCATION: LOCATION: FOOT

## 2017-10-30 NOTE — PROGRESS NOTES
[address] [address]  Outpatient Physical Therapy    Treatment Note        Date: 10/30/2017  Patient: Wendi Samaniego  : 1965  ACCT #: [de-identified]  Referring Practitioner: Dr. Loy Mayen  Diagnosis: Achilles tendon rupture     Visit Information:  PT Visit Information  PT Insurance Information: [employer] P[employer]  Total # of Visits to Date: 3  Progress Note Counter: 3/10    Subjective: Pt to clinic w/ new script from 10-26 : Wean out of boot over 1 week  ( uncertain what that means) , NO barefoot walking. Start APROM, PROM ( pt states MD stated past 90 deg DF), < 5 lbs resistance; 1 felt heel lift in regulat shoe for weeks ( pt brought lift and shoe as he has not initiated this yet), Stated MD estrada'rhoda IDN ; Given Capsaisin for nerve pain byut feels too hot; Has been trying to desnesitize  Comments: new script from 10-26 : Wean out of boot over 1 week , NO barefoot walking. ( pt states MD stated ROM past 90 deg DF), < 5 lbs resistance; 1 felt heel lift in regulat shoe for weeks  HEP Compliance:  [x] Good [] Fair [] Poor [] Reports not doing due to:    Vital Signs  Patient Currently in Pain: Yes (if nothing is touching it)   Pain Screening  Patient Currently in Pain: Yes (if nothing is touching it)  Pain Assessment  Pain Assessment: 0-10  Pain Level: 2  Pain Type: Neuropathic pain  Pain Location: Foot  Pain Orientation: Left; Outer  Pain Descriptors: Numbness;Tingling  Pain Frequency: Continuous    OBJECTIVE:   Exercises  Exercise 2: Towel scrunches x 3 mins   Exercise 3: [place]  x 23min  Exercise 5: Gastroc and solues stretch w/ strap 3 x 30 s ea  Exercise 6: lat stepping B x 2 mins in shoe w/ loift  Exercise 7: amb in shoe w/ lift x 2 mins  Exercise 18:  HEP Gastroc and soleus stretches                         Manual:   Manual therapy  PROM: PROM and stretching into DF   Soft Tissue Mobalization: 5  mins IDN to normalize inflammation, dec pain and balance MS mechanics ( see DN diagram for sites needled that will be scanned into EMR upon D/C)STM,  to   L gastroc / soleus complex to dec soreness, cross frictioin and STM  over scar,; ;  total time 18    Modalities:        *Indicates exercise, modality, or manual techniques to be initiated when appropriate    Assessment: Body structures, Functions, Activity limitations: Decreased functional mobility , Decreased ROM, Decreased strength, Decreased balance, Decreased high-level IADLs  Assessment: Initiated IDN this date w/ good tolerance; Initiated PROM to full available DF ROM  and gastroc and soleus stretches as well; Initiated amb in shoe w/ 1 heel wedge noting dec eccentric control of ankle w/ initial loading;  Treatment Diagnosis: L Ankle Pain ;L Ankle Weakness     Patient Education: On weaning out of boot, using tolerance as a guide and progressing slowly;     Goals:  Short term goals  Time Frame for Short term goals: 5 weeks (ROM, strength, gait, balance goals in effect when clearance provided from MD)  Short term goal 1: Pt will be indep with HEP for ROM   Short term goal 2: Pt will demonstrate L foot/ankle motion that is [facility] [place] and pain free   Short term goal 3: Pt will complete 5 L SL heel raises indep   Short term goal 4: LEFS will improve by 15 or more points to demonstrate improved lower extremity function  Short term goal 5: Pt will demonstrate at least 4/5 strength in L foot/ankle     Long term goals  Long term goal 1: Pt will amb .5 mile without AD or boot with smooth, reciprocal pattern without incraesed in pain  Long term goal 2: Pt will maintain L SL stance for 30 seconds on level and compliant surface indep  Progress toward goals:    POST-PAIN       Pain Rating (0-10 pain scale): 2-3  /10   Location and pain description same as pre-treatment unless indicated.    Action: [] NA   [x] Perform HEP  [] Meds as prescribed  [] Modalities as prescribed   [] Call Physician     Frequency/Duration:  Plan  Times per week: 1 to every other week until able to begin strengthening, gait training, increased ROM  Plan weeks: 5-up to 10 visits  Specific instructions for Next Treatment: NO barefoot walking; No lifting > 5 lbs; ROM beyond nuetral DF  Current Treatment Recommendations: ROM, Balance Training (no DF past neutral until after pt follow up on 10/26, strengthening, ROM, activity restrictions per MD guidelines)  Plan Comment: POC to include IDN if MD agrees; Pt to continue current HEP. See objective section for any therapeutic exercise changes, additions or modifications this date.          PT Individual Minutes  Time In: 1994  Time Out: 1643  Minutes: 43     Procedure Minutes:0    Signature:  Electronically signed by Sandra Valdes PT on 10/30/17 at 2:00 PM

## 2017-11-06 ENCOUNTER — HOSPITAL ENCOUNTER (OUTPATIENT)
Dept: PHYSICAL THERAPY | Age: 52
Setting detail: THERAPIES SERIES
Discharge: HOME | End: 2017-11-06
Payer: COMMERCIAL

## 2017-11-06 PROCEDURE — 97140 MANUAL THERAPY 1/> REGIONS: CPT

## 2017-11-06 PROCEDURE — 97110 THERAPEUTIC EXERCISES: CPT

## 2017-11-06 ASSESSMENT — PAIN DESCRIPTION - PAIN TYPE: TYPE: NEUROPATHIC PAIN

## 2017-11-06 ASSESSMENT — PAIN DESCRIPTION - ORIENTATION: ORIENTATION: LEFT

## 2017-11-06 ASSESSMENT — PAIN SCALES - GENERAL: PAINLEVEL_OUTOF10: 2

## 2017-11-06 ASSESSMENT — PAIN DESCRIPTION - LOCATION: LOCATION: ANKLE

## 2017-11-06 ASSESSMENT — PAIN DESCRIPTION - DESCRIPTORS: DESCRIPTORS: NUMBNESS;TINGLING

## 2017-11-06 ASSESSMENT — PAIN DESCRIPTION - FREQUENCY: FREQUENCY: CONTINUOUS

## 2017-11-06 NOTE — PROGRESS NOTES
DF  Current Treatment Recommendations: ROM, Balance Training (no DF past neutral until after pt follow up on 10/26, strengthening, ROM, activity restrictions per MD guidelines)  Plan Comment: POC to include IDN if MD agrees; Pt to continue current HEP. See objective section for any therapeutic exercise changes, additions or modifications this date.          PT Individual Minutes  Time In: 5776  Time Out: 1539  Minutes: 40  Timed Code Treatment Minutes: 40 Minutes  Procedure Minutes:0  Signature:  Electronically signed by Vitaliy Salmon PT on 11/6/17 at 9:46 AM

## 2017-11-09 ENCOUNTER — HOSPITAL ENCOUNTER (OUTPATIENT)
Dept: PHYSICAL THERAPY | Age: 52
Setting detail: THERAPIES SERIES
Discharge: HOME | End: 2017-11-09
Payer: COMMERCIAL

## 2017-11-09 PROCEDURE — 97110 THERAPEUTIC EXERCISES: CPT

## 2017-11-09 PROCEDURE — 97140 MANUAL THERAPY 1/> REGIONS: CPT

## 2017-11-09 ASSESSMENT — PAIN DESCRIPTION - FREQUENCY: FREQUENCY: CONTINUOUS

## 2017-11-09 ASSESSMENT — PAIN DESCRIPTION - PAIN TYPE: TYPE: NEUROPATHIC PAIN

## 2017-11-09 ASSESSMENT — PAIN DESCRIPTION - DESCRIPTORS: DESCRIPTORS: NUMBNESS;TINGLING

## 2017-11-09 ASSESSMENT — PAIN DESCRIPTION - LOCATION: LOCATION: ANKLE

## 2017-11-09 ASSESSMENT — PAIN SCALES - GENERAL: PAINLEVEL_OUTOF10: 4

## 2017-11-09 NOTE — PROGRESS NOTES
[address] [address]  Outpatient Physical Therapy    Treatment Note        Date: 2017  Patient: Erika Gallardo  : 1965  ACCT #: [de-identified]  Referring Practitioner: Dr. 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 Comments: new script from 10-26 : Wean out of boot over 1 week , NO barefoot walking. ( pt states MD stated ROM past 90 deg DF), < 5 lbs resistance; 1 felt heel lift in regulat shoe for 4  weeks  HEP Compliance:  [x] Good [] Fair [] Poor [] Reports not doing due to:    Vital Signs  Patient Currently in Pain: Yes (if nothing is touching it)   Pain Screening  Patient Currently in Pain: Yes (if nothing is touching it)  Pain Assessment  Pain Assessment: 0-10  Pain Level: 4  Pain Type: Neuropathic pain  Pain Location: Ankle  Pain Descriptors: Numbness;Tingling  Pain Frequency: Continuous    OBJECTIVE:   Exercises  Exercise 2: Towel scrunches x 3 mins   Exercise 10: seated HR / TR x 20 ea  Exercise 11: BOSU  sqts 2x 10   Exercise 12:  KT fan , base at medial gastroc, 4 fingers tape off tension downward  to decrease swelling               Manual:   Manual therapy  Soft Tissue Mobalization: 5  mins IDN to normalize inflammation, dec pain and balance MS mechanics ( see DN diagram for sites needled that will be scanned into EMR upon D/C)STM,, L gastroc / soleus complex and static cupping to sup scar ( 1.5\" dup x 3 mins) 1\" cup to medial distal scar x 5 mins all  to dec tissue tightness and swelling cross friction total time 24    Modalities:        *Indicates exercise, modality, or manual techniques to be initiated when appropriate    Assessment: Body structures, Functions, Activity limitations: Decreased functional mobility , Decreased ROM, Decreased strength, Decreased balance, Decreased high-level IADLs  Assessment: Static cupping and IDN and KT to assist w/ decreasing and controlling edema; Added BOSU sqts pain free range for nmr control. Dec nerve pain reported post session; Pt demos improving gait w/ cont dec in L SLS time  Treatment Diagnosis: L Ankle Pain ;L Ankle Weakness     Patient Education: start w/ 5 mins walking on TM to test erna of continuous walking ( pt reports 6000 steps the other day) before trying to walk the family dog    Goals:  Short term goals  Time Frame for Short term goals: 5 weeks (ROM, strength, gait, balance goals in effect when clearance provided from MD)  Short term goal 1: Pt will be indep with HEP for ROM   Short term goal 2: Pt will demonstrate L foot/ankle motion that is [facility] and pain free   Short term goal 3: Pt will complete 5 L SL heel raises indep   Short term goal 4: LEFS will improve by 15 or more points to demonstrate improved lower extremity function  Short term goal 5: Pt will demonstrate at least 4/5 strength in L foot/ankle     Long term goals  Long term goal 1: Pt will amb .5 mile without AD or boot with smooth, reciprocal pattern without incraesed in pain  Long term goal 2: Pt will maintain L SL stance for 30 seconds on level and compliant surface indep  Progress toward goals:    POST-PAIN       Pain Rating (0-10 pain scale):3  /10   Location and pain description same as pre-treatment unless indicated. Action: [] NA   [x] Perform HEP  [] Meds as prescribed  [] Modalities as prescribed   [] Call Physician     Frequency/Duration:  Plan  Times per week: 1 to every other week until able to begin strengthening, gait training, increased ROM  Plan weeks: 5-up to 10 visits  Specific instructions for Next Treatment: NO barefoot walking;  No lifting > 5 lbs; ROM beyond nuetral DF  Current Treatment

## 2017-11-15 ENCOUNTER — APPOINTMENT (OUTPATIENT)
Dept: PHYSICAL THERAPY | Age: 52
End: 2017-11-15
Payer: COMMERCIAL

## 2017-11-16 ENCOUNTER — HOSPITAL ENCOUNTER (OUTPATIENT)
Dept: PHYSICAL THERAPY | Age: 52
Setting detail: THERAPIES SERIES
Discharge: HOME | End: 2017-11-16
Payer: COMMERCIAL

## 2017-11-16 PROCEDURE — 97110 THERAPEUTIC EXERCISES: CPT

## 2017-11-16 PROCEDURE — 97140 MANUAL THERAPY 1/> REGIONS: CPT

## 2017-11-16 ASSESSMENT — PAIN DESCRIPTION - FREQUENCY: FREQUENCY: CONTINUOUS

## 2017-11-16 ASSESSMENT — PAIN DESCRIPTION - ORIENTATION: ORIENTATION: LEFT

## 2017-11-16 ASSESSMENT — PAIN DESCRIPTION - LOCATION: LOCATION: FOOT

## 2017-11-16 ASSESSMENT — PAIN SCALES - GENERAL: PAINLEVEL_OUTOF10: 4

## 2017-11-16 ASSESSMENT — PAIN DESCRIPTION - PAIN TYPE: TYPE: NEUROPATHIC PAIN

## 2017-11-16 NOTE — PROGRESS NOTES
[address] [address]  Outpatient Physical Therapy    Treatment Note        Date: 2017  Patient: Tania Mail  : 1965  ACCT #: [de-identified]  Referring Practitioner: Dr. Yumiko Tripp  Diagnosis: Achilles tendon rupture     Visit Information:  PT Visit Information  PT Insurance Information: [employer] PPO  Total # of Visits to Date: 6  No Show: 0  Canceled Appointment: 0  Progress Note Counter: 6/10    Subjective: pt reports pain all the time  Comments: new script from 10-26 : Wean out of boot over 1 week , NO barefoot walking. ( pt states MD stated ROM past 90 deg DF), < 5 lbs resistance; 1 felt heel lift in regulat shoe for 4  weeks  HEP Compliance:  [x] Good [] Fair [] Poor [] Reports not doing due to:    Vital Signs  Patient Currently in Pain: Yes (if nothing is touching it)   Pain Screening  Patient Currently in Pain: Yes (if nothing is touching it)  Pain Assessment  Pain Level: 4  Pain Type: Neuropathic pain  Pain Location: Foot  Pain Orientation: Left  Pain Frequency: Continuous    OBJECTIVE:   Exercises  Exercise 7: SLS lt 20s x3  Exercise 8: SL eccentric lowering x 10  Exercise 9: BAPS - L2 4 way x 20  Exercise 10: standing HR/TR X 20  Exercise 11: BOSU  sqts , lunges 2x 10   Exercise 12: KT fan , base at medial gastroc, 4 fingers tape off tension downward  to decrease swelling  Exercise 13: Consider Alter G *    Strength: [x] NT  [] MMT completed:     ROM: [x] NT  [] ROM measurements:      Manual:   Manual therapy  PROM: PROM and stretching into DF , toe stretches, heel, plantar  Soft Tissue Mobalization: 5  mins IDN to normalize inflammation, dec pain and balance MS mechanics ( see DN diagram for sites needled that will be scanned into EMR upon D/C)STM,, L gastroc / soleus complex    Modalities:        *Indicates exercise, modality, or manual techniques to be initiated when appropriate    Assessment:         Body structures, Functions, Activity limitations: Decreased functional mobility , Decreased ROM, Decreased strength, Decreased balance, Decreased high-level IADLs  Assessment: Initiated SLS and eccentric lowering for inc. strengthening, Bosu lunges and Baps board for rom in pain free range as pt attempts to push further. Dec. apin after session  Treatment Diagnosis: L Ankle Pain ;L Ankle Weakness          Goals:  Short term goals  Time Frame for Short term goals: 5 weeks (ROM, strength, gait, balance goals in effect when clearance provided from MD)  Short term goal 1: Pt will be indep with HEP for ROM   Short term goal 2: Pt will demonstrate L foot/ankle motion that is [facility] and pain free   Short term goal 3: Pt will complete 5 L SL heel raises indep   Short term goal 4: LEFS will improve by 15 or more points to demonstrate improved lower extremity function  Short term goal 5: Pt will demonstrate at least 4/5 strength in L foot/ankle     Long term goals  Long term goal 1: Pt will amb .5 mile without AD or boot with smooth, reciprocal pattern without incraesed in pain  Long term goal 2: Pt will maintain L SL stance for 30 seconds on level and compliant surface indep  Progress toward goals:strength, rom    POST-PAIN       Pain Rating (0-10 pain scale): 2-3/10   Location and pain description same as pre-treatment unless indicated. Action: [] NA   [] Perform HEP  [] Meds as prescribed  [x] Modalities as prescribed   [] Call Physician     Frequency/Duration:  Plan  Times per week: 1 to every other week until able to begin strengthening, gait training, increased ROM  Plan weeks: 5-up to 10 visits  Specific instructions for Next Treatment: NO barefoot walking; No lifting > 5 lbs; ROM beyond nuetral DF  Current Treatment Recommendations: ROM, Balance Training (no DF past neutral until after pt follow up on 10/26, strengthening, ROM, activity restrictions per MD guidelines)  Plan Comment: POC to include IDN if MD agrees; Pt to continue current HEP.   See objective section for any therapeutic exercise

## 2017-11-20 ENCOUNTER — HOSPITAL ENCOUNTER (OUTPATIENT)
Dept: PHYSICAL THERAPY | Age: 52
Setting detail: THERAPIES SERIES
Discharge: HOME | End: 2017-11-20
Payer: COMMERCIAL

## 2017-11-20 PROCEDURE — 97110 THERAPEUTIC EXERCISES: CPT

## 2017-11-20 PROCEDURE — 97140 MANUAL THERAPY 1/> REGIONS: CPT

## 2017-11-20 ASSESSMENT — PAIN SCALES - GENERAL: PAINLEVEL_OUTOF10: 4

## 2017-11-20 ASSESSMENT — PAIN DESCRIPTION - PAIN TYPE: TYPE: CHRONIC PAIN

## 2017-11-20 ASSESSMENT — PAIN DESCRIPTION - FREQUENCY: FREQUENCY: CONTINUOUS

## 2017-11-20 ASSESSMENT — PAIN DESCRIPTION - DESCRIPTORS: DESCRIPTORS: TINGLING;BURNING;NUMBNESS

## 2017-11-20 ASSESSMENT — PAIN DESCRIPTION - ORIENTATION: ORIENTATION: LEFT;OUTER

## 2017-11-20 ASSESSMENT — PAIN DESCRIPTION - LOCATION: LOCATION: ANKLE

## 2017-11-20 NOTE — PROGRESS NOTES
[address] [address]  Outpatient Physical Therapy    Treatment Note        Date: 2017  Patient: Ruthie Jean  : 1965  ACCT #: [de-identified]  Referring Practitioner: Dr. Sarah Cheatham  Diagnosis: Achilles tendon rupture     Visit Information:  PT Visit Information  PT Insurance Information: [employer] PPO  Total # of Visits to Date: 7  Progress Note Counter: 7/10    Subjective: Calf is getting stronger; Nerve pain is the same; Unabel to find a \"good spot\" to lay to get to sleep - now it is not going away and feels like the inside othe ankle ( postero-medial) is loosing feeling - not painful though. MM fatigue w/ towel scrunches and when on feet a while; Able to take dog for a walk ok. Comments: new script from 10-26 : Wean out of boot over 1 week , NO barefoot walking. ( pt states MD stated ROM past 90 deg DF), < 5 lbs resistance; 1 felt heel lift in regular  shoe for 4  weeks  HEP Compliance:  [x] Good [] Fair [] Poor [] Reports not doing due to:    Vital Signs  Patient Currently in Pain: Yes (if nothing is touching it)   Pain Screening  Patient Currently in Pain: Yes (if nothing is touching it)  Pain Assessment  Pain Assessment: 0-10  Pain Level: 4  Pain Type: Chronic pain  Pain Location: Ankle  Pain Orientation: Left; Outer  Pain Radiating Towards: medial L ankle numb  Pain Descriptors: Tingling;Burning;Numbness  Pain Frequency: Continuous    OBJECTIVE:   Exercises  Exercise 8: B HR w/ inc L SLS eccentric lowering x 12  Exercise 9: BAPS - L2 4 way x 20  Exercise 11: BOSU  sqts , lunges F x 15 L   Exercise 17:  MMT and AROM msrs         Strength: [] NT  [x] MMT completed:     Strength LLE  Comment: DF, PF, EV,INV 5/5;         Strength Other  Other: unable to lift L heel in L SL PF     ROM: [] NT  [x] ROM measurements:           AROM LLE (degrees)  LLE General AROM: DF 6  w/ knee extended;  PF 44; , inv 36, ev 22           Manual:   Manual therapy  PROM: PROM and stretching into DF , PF and  Total

## 2017-11-22 ENCOUNTER — APPOINTMENT (OUTPATIENT)
Dept: PHYSICAL THERAPY | Age: 52
End: 2017-11-22
Payer: COMMERCIAL

## 2017-12-18 ENCOUNTER — HOSPITAL ENCOUNTER (OUTPATIENT)
Dept: PHYSICAL THERAPY | Age: 52
Setting detail: THERAPIES SERIES
Discharge: HOME | End: 2017-12-18
Payer: COMMERCIAL

## 2017-12-18 PROCEDURE — 97032 APPL MODALITY 1+ESTIM EA 15: CPT

## 2017-12-18 PROCEDURE — 97110 THERAPEUTIC EXERCISES: CPT

## 2017-12-18 ASSESSMENT — PAIN DESCRIPTION - DESCRIPTORS: DESCRIPTORS: BURNING

## 2017-12-18 ASSESSMENT — PAIN DESCRIPTION - PAIN TYPE: TYPE: CHRONIC PAIN

## 2017-12-18 ASSESSMENT — PAIN DESCRIPTION - LOCATION: LOCATION: FOOT

## 2017-12-18 ASSESSMENT — PAIN DESCRIPTION - ORIENTATION: ORIENTATION: LEFT;OUTER

## 2017-12-18 ASSESSMENT — PAIN SCALES - GENERAL: PAINLEVEL_OUTOF10: 3

## 2017-12-18 NOTE — PROGRESS NOTES
[address] [address]  Outpatient Physical Therapy    Treatment Note        Date: 2017  Patient: Brooklyn Mujica  : 1965  ACCT #: [de-identified]  Referring Practitioner: Dr. Lucian Donato  Diagnosis: Achilles tendon rupture     Visit Information:  PT Visit Information  PT Insurance Information: [employer] PPO  Total # of Visits to Date: 8  Progress Note Counter: - ( per MD requesting 8 additional)     Subjective: Pt retruns to clinic w/ cc/o significant weakness in L ankle in (PF) beyond neutral and reporting he has \" sural neuritis\" w/ request of cont DN, DTM for an additional 8 visits - left script at home ; Swelling is getting worse- is on his feet alot but is bad even when he is sitting from the ankle up      HEP Compliance:  [x] Good [] Fair [] Poor [] Reports not doing due to:    Vital Signs  Patient Currently in Pain: Yes (if nothing is touching it)   Pain Screening  Patient Currently in Pain: Yes (if nothing is touching it)  Pain Assessment  Pain Assessment: 0-10  Pain Level: 3  Pain Type: Chronic pain  Pain Location: Foot  Pain Orientation: Left; Outer  Pain Descriptors: Burning    OBJECTIVE:   Exercises  Exercise 14: KT : 4 tails down calf to draw edema up to anchor at lat proximal lower leg  Exercise 15: Seated HR s w/ cuing to keep L foot neutral and w/ mvmt mechanics equal to r  x 20  Exercise 16: Edu re sural neuritis and DN ENS, trialing compression socks , performing ankle pumps w/ foot elevated when seated to control edema ( 14 mins)         Strength: [] NT  [x] MMT completed:     Strength LLE  Comment: PF 4/5; Inv, Ev , DF 5/5        Strength Other  Other: unable to lift L heel in L SL PF     ROM: [x] NT  [] ROM measurements:     Manual:        Modalities:  Modalities  E-stim (parameters): DN ENS to sural and lat cutaneous branch of Sural nerve at 4 MHz x 10 mins w/ adjuctive DN during Tx to normalize inflammation, dec pain and balance MS mechanics ( see DN diagram for sites barefoot walking; No lifting > 5 lbs; ROM beyond nuetral DF  Current Treatment Recommendations: ROM, Balance Training (no DF past neutral until after pt follow up on 10/26, strengthening, ROM, activity restrictions per MD guidelines)  Plan Comment: Cont w/ skilled care to progress L PF strength gait and L SLS stability      Pt to continue current HEP. See objective section for any therapeutic exercise changes, additions or modifications this date.          PT Individual Minutes  Time In: 5792  Time Out: 0845  Minutes: 39  Timed Code Treatment Minutes: 39 Minutes  Procedure Minutes: 0    Signature:  Electronically signed by Arti Hurtado, PT on 12/18/17 at 2:13 PM

## 2017-12-20 ENCOUNTER — HOSPITAL ENCOUNTER (OUTPATIENT)
Dept: PHYSICAL THERAPY | Age: 52
Setting detail: THERAPIES SERIES
Discharge: HOME | End: 2017-12-20
Payer: COMMERCIAL

## 2017-12-20 PROCEDURE — 97032 APPL MODALITY 1+ESTIM EA 15: CPT

## 2017-12-20 PROCEDURE — 97140 MANUAL THERAPY 1/> REGIONS: CPT

## 2017-12-20 PROCEDURE — 97110 THERAPEUTIC EXERCISES: CPT

## 2017-12-20 ASSESSMENT — PAIN DESCRIPTION - ORIENTATION: ORIENTATION: LEFT;OUTER

## 2017-12-20 ASSESSMENT — PAIN DESCRIPTION - PAIN TYPE: TYPE: CHRONIC PAIN

## 2017-12-20 ASSESSMENT — PAIN DESCRIPTION - DIRECTION: RADIATING_TOWARDS: HEEL

## 2017-12-20 ASSESSMENT — PAIN DESCRIPTION - LOCATION: LOCATION: FOOT

## 2017-12-20 ASSESSMENT — PAIN DESCRIPTION - FREQUENCY: FREQUENCY: CONTINUOUS

## 2017-12-20 ASSESSMENT — PAIN SCALES - GENERAL: PAINLEVEL_OUTOF10: 3

## 2017-12-20 NOTE — PROGRESS NOTES
[address] [address]  Outpatient Physical Therapy    Treatment Note        Date: 2017  Patient: Rock Headings  : 1965  ACCT #: [de-identified]  Referring Practitioner: Dr. Honorio Lopez  Diagnosis: Achilles tendon rupture     Visit Information:  PT Visit Information  PT Insurance Information: [employer] PPO  Total # of Visits to Date: 9  Progress Note Counter: - ( per MD requesting 8 additional)     Subjective: Pt states things felt better after the (DN ENS) Tx last visit; Pt daljit in new script for extending visits     HEP Compliance:  [x] Good [] Fair [] Poor [] Reports not doing due to:    Vital Signs  Patient Currently in Pain: Yes (if nothing is touching it)   Pain Screening  Patient Currently in Pain: Yes (if nothing is touching it)  Pain Assessment  Pain Assessment: 0-10  Pain Level: 3  Pain Type: Chronic pain  Pain Location: Foot  Pain Orientation: Left; Outer  Pain Radiating Towards: heel  Pain Frequency: Continuous    OBJECTIVE:   Exercises  Exercise 10: standing HR, Assisted L HR w/ controlled L eccentric x 25  Exercise 13: 4 way hip focus SLS L YTB x 10  Exercise 14: KT : 4 tails down calf to draw edema up to anchor at lat proximal lower leg  Exercise 15: Seated HR s w/ cuing to keep L foot neutral and w/ mvmt mechanics equal to r  x 20                  Manual:   Manual therapy  PROM: PROM DF- PF w/ stretching in both directions  Soft Tissue Mobalization: DTM though Gastroc / Soleus complex, achilles, and Heel; Total time L 7 mins     Modalities:  Modalities  E-stim (parameters): DN ENS across sural and to lat cutaneous branch of Sural nerve at 5.5 Hz x 10 mins w/ adjuctive DN during Tx to normalize inflammation, dec pain and balance MS mechanics ( see DN diagram for sites needled that will be scanned into EMR upon D/C)  Other: DTM though Gastroc / Soleus complex      *Indicates exercise, modality, or manual techniques to be initiated when appropriate    Assessment:         Body structures, Functions, Activity limitations: Decreased functional mobility , Decreased ROM, Decreased strength, Decreased balance, Decreased high-level IADLs  Assessment: Pt demos much dec swelling this date and reports nerve pain has been less since IDN ENS last session; Cont w/ strengthening ankle PF and improving SLS stability of L LE. Treatment Diagnosis: L Ankle Pain ;L Ankle Weakness          Goals:  Short term goals  Time Frame for Short term goals: 5 weeks (ROM, strength, gait, balance goals in effect when clearance provided from MD)  Short term goal 1: Pt will be indep with HEP for ROM   Short term goal 2: Pt will demonstrate L foot/ankle motion that is [facility] [place] and pain free   Short term goal 3: Pt will complete 5 L SL heel raises indep   Short term goal 4: LEFS will inc to >/= 39/80 to  demonstrate improved lower extremity function  Short term goal 5: Pt will demonstrate at least 4/5 strength in L foot/ankle     Long term goals  Long term goal 1: Pt will amb .5 mile without AD or boot with smooth, reciprocal pattern without incraesed in pain  Long term goal 2: Pt will maintain L SL stance for 30 seconds on level and compliant surface indep  Long term goal 3: Inc L ankle AROM to >/= 10 deg DF, 50 deg PF to improve erna to prolonged walking  Long term goal 4: Pt will inc LEFS to >/= 60/80  Progress toward goals:    POST-PAIN       Pain Rating (0-10 pain scale): 1  /10   Location and pain description same as pre-treatment unless indicated. Action: [x] NA   [] Perform HEP  [] Meds as prescribed  [] Modalities as prescribed   [] Call Physician     Frequency/Duration:  Plan  Times per week: 1-2  Plan weeks: 4 for additional 8 visits to origianl 10  Specific instructions for Next Treatment: NO barefoot walking;  No lifting > 5 lbs; ROM beyond nuetral DF  Current Treatment Recommendations: ROM, Balance Training (no DF past neutral until after pt follow up on 10/26, strengthening, ROM, activity restrictions per MD

## 2017-12-26 ENCOUNTER — HOSPITAL ENCOUNTER (OUTPATIENT)
Dept: PHYSICAL THERAPY | Age: 52
Setting detail: THERAPIES SERIES
Discharge: HOME | End: 2017-12-26
Payer: COMMERCIAL

## 2017-12-26 PROCEDURE — 97032 APPL MODALITY 1+ESTIM EA 15: CPT

## 2017-12-26 PROCEDURE — 97140 MANUAL THERAPY 1/> REGIONS: CPT

## 2017-12-26 PROCEDURE — 97110 THERAPEUTIC EXERCISES: CPT

## 2017-12-26 ASSESSMENT — PAIN DESCRIPTION - PAIN TYPE: TYPE: CHRONIC PAIN

## 2017-12-26 ASSESSMENT — PAIN DESCRIPTION - FREQUENCY: FREQUENCY: CONTINUOUS

## 2017-12-26 ASSESSMENT — PAIN DESCRIPTION - LOCATION: LOCATION: FOOT

## 2017-12-26 ASSESSMENT — PAIN DESCRIPTION - DESCRIPTORS: DESCRIPTORS: BURNING;NUMBNESS;TINGLING

## 2017-12-26 ASSESSMENT — PAIN DESCRIPTION - ORIENTATION: ORIENTATION: LEFT;OUTER

## 2017-12-26 NOTE — PROGRESS NOTES
[address] [address]  Outpatient Physical Therapy    Treatment Note        Date: 2017  Patient: Monique Kaur  : 1965  ACCT #: [de-identified]  Referring Practitioner: Dr. Arnold Hines  Diagnosis: Achilles tendon rupture     Visit Information:  PT Visit Information  PT Insurance Information: [employer] PPO  Total # of Visits to Date: 10  Progress Note Counter: 10/16     Subjective: it's been a tough week - the swelling and therefore nerv pain as weel have been worse than they have been for a while; Was in a car for several hours. HEP Compliance:  [x] Good [] Fair [] Poor [] Reports not doing due to:    Vital Signs  Patient Currently in Pain: Yes (if nothing is touching it)   Pain Screening  Patient Currently in Pain: Yes (if nothing is touching it)  Pain Assessment  Pain Assessment: 0-10  Pain Type: Chronic pain  Pain Location: Foot  Pain Orientation: Left; Outer  Pain Descriptors: Burning;Numbness;Tingling  Pain Frequency: Continuous    OBJECTIVE:   Exercises  Exercise 10: standing HR, Assisted L HR w/ controlled L eccentric x 20  Exercise 15: Seated HR s w/ cuing to keep L foot neutral and w/ mvmt mechanics equal to r  x 30  Exercise 18: edu on trying compression socks vs ace bandage to control swelling  Exercise 19: Edu on retro walking at home as pt reported prolonged retro walking w/ idalia work and having less pain and swelling even after. Manual:   Manual therapy  PROM: PROM DF- PF w/ stretching in both directions  Soft Tissue Mobalization: DTM though Gastroc / Soleus complex, achilles,ankle  and Heel;  Total time 14 mins   Other:  4 minsIDN to normalize inflammation, dec pain and balance MS mechanics ( see DN diagram for sites needled that will be scanned into EMR upon D/C)    Modalities:  Modalities  E-stim (parameters): DN ENS from sural to lat cutaneous branch of Sural nerve at 5.5 Hz x 10 mins w/ adjuctive DN to L sural adn B tibial HPs during Tx to normalize inflammation, dec pain and balance MS mechanics ( see DN diagram for sites needled that will be scanned into EMR upon D/C)     *Indicates exercise, modality, or manual techniques to be initiated when appropriate    Assessment: Body structures, Functions, Activity limitations: Decreased functional mobility , Decreased ROM, Decreased strength, Decreased balance, Decreased high-level IADLs  Assessment: Pt w/ slt pitting of edema in post and lat ankle and distal lower leg;Cont w/ IDN and DN ENS to normalize inflammation through sural nerve pathway. Treatment Diagnosis: L Ankle Pain ;L Ankle Weakness          Goals:  Short term goals  Time Frame for Short term goals: 5 weeks (ROM, strength, gait, balance goals in effect when clearance provided from MD)  Short term goal 1: Pt will be indep with HEP for ROM   Short term goal 2: Pt will demonstrate L foot[facility]le [facility] and pain free   Short term goal 3: Pt will complete 5 L SL heel raises indep   Short term goal 4: LEFS will inc to >/= 39/80 to  demonstrate improved lower extremity function  Short term goal 5: Pt will demonstrate at least 4/5 strength in L foot/ankle     Long term goals  Long term goal 1: Pt will amb .5 mile without AD or boot with smooth, reciprocal pattern without incraesed in pain  Long term goal 2: Pt will maintain L SL stance for 30 seconds on level and compliant surface indep  Long term goal 3: Inc L ankle AROM to >/= 10 deg DF, 50 deg PF to improve erna to prolonged walking  Long term goal 4: Pt will inc LEFS to >/= 60/80  Progress toward goals:    POST-PAIN       Pain Rating (0-10 pain scale):  3 /10   Location and pain description same as pre-treatment unless indicated.    Action: [] NA   [x] Perform HEP  [] Meds as prescribed  [] Modalities as prescribed   [] Call Physician     Frequency/Duration:  Plan  Times per week: 1-2  Plan weeks: 4 for additional 8 visits to origianl 10  Current Treatment Recommendations: ROM, Balance Training (no DF past neutral until after pt follow up on 10/26, strengthening, ROM, activity restrictions per MD guidelines)  Plan Comment: Cont w/ skilled care to progress L PF strength gait and L SLS stability      Pt to continue current HEP. See objective section for any therapeutic exercise changes, additions or modifications this date.          PT Individual Minutes  Time In: 7456  Time Out: [address]  Minutes: 38  Timed Code Treatment Minutes: 38 Minutes  Procedure Minutes: 0    Signature:  Electronically signed by Epifanio Britt PT on 12/26/17 at 12:26 PM

## 2017-12-28 ENCOUNTER — HOSPITAL ENCOUNTER (OUTPATIENT)
Dept: PHYSICAL THERAPY | Age: 52
Setting detail: THERAPIES SERIES
Discharge: HOME | End: 2017-12-28
Payer: COMMERCIAL

## 2017-12-28 PROCEDURE — 97140 MANUAL THERAPY 1/> REGIONS: CPT

## 2017-12-28 PROCEDURE — 97032 APPL MODALITY 1+ESTIM EA 15: CPT

## 2017-12-28 ASSESSMENT — PAIN SCALES - GENERAL: PAINLEVEL_OUTOF10: 4

## 2017-12-28 ASSESSMENT — PAIN DESCRIPTION - PAIN TYPE: TYPE: CHRONIC PAIN

## 2017-12-28 ASSESSMENT — PAIN DESCRIPTION - ORIENTATION: ORIENTATION: LEFT;OUTER

## 2017-12-28 ASSESSMENT — PAIN DESCRIPTION - LOCATION: LOCATION: FOOT

## 2017-12-28 ASSESSMENT — PAIN DESCRIPTION - DESCRIPTORS: DESCRIPTORS: BURNING;NUMBNESS;TINGLING

## 2018-01-02 ENCOUNTER — HOSPITAL ENCOUNTER (OUTPATIENT)
Dept: PHYSICAL THERAPY | Age: 53
Setting detail: THERAPIES SERIES
Discharge: HOME | End: 2018-01-02
Payer: COMMERCIAL

## 2018-01-02 PROCEDURE — 97110 THERAPEUTIC EXERCISES: CPT

## 2018-01-02 PROCEDURE — 97140 MANUAL THERAPY 1/> REGIONS: CPT

## 2018-01-02 PROCEDURE — 97032 APPL MODALITY 1+ESTIM EA 15: CPT

## 2018-01-02 ASSESSMENT — PAIN SCALES - GENERAL: PAINLEVEL_OUTOF10: 3

## 2018-01-02 ASSESSMENT — PAIN DESCRIPTION - PAIN TYPE: TYPE: CHRONIC PAIN

## 2018-01-02 ASSESSMENT — PAIN DESCRIPTION - LOCATION: LOCATION: FOOT

## 2018-01-02 ASSESSMENT — PAIN DESCRIPTION - ORIENTATION: ORIENTATION: LEFT;OUTER

## 2018-03-22 ENCOUNTER — TELEPHONE (OUTPATIENT)
Dept: SURGERY | Age: 53
End: 2018-03-22

## 2018-03-22 ENCOUNTER — CLINICAL DOCUMENTATION (OUTPATIENT)
Dept: PHYSICAL THERAPY | Age: 53
End: 2018-03-22

## 2018-03-22 RX ORDER — AMOXICILLIN AND CLAVULANATE POTASSIUM 875; 125 MG/1; MG/1
1 TABLET, FILM COATED ORAL 2 TIMES DAILY
Qty: 28 TABLET | Refills: 1 | Status: SHIPPED | OUTPATIENT
Start: 2018-03-22 | End: 2018-04-05

## 2018-03-22 NOTE — PROGRESS NOTES
[address]                                  Ph: [phone]  Fax: [phone]     [] Certification  [] Recertification  []  Plan of Care  [] Progress Note [x] Discharge                            To:  Referring Practitioner: Dr. Mcwilliams Rolling           From:  Rupesh Bettencourt, PT  Patient: Debra Muller     : 1965  Diagnosis: Achilles tendon rupture      Date: 2017  Treatment Diagnosis: L Ankle Pain ;L Ankle Weakness     Progress Report Period from:  2017 to 3-20-18     Total # of Visits to Date: 12  NS 1              OBJECTIVE: from PN 17  Short Term Goals - Time Frame for Short term goals: 5 weeks (ROM, strength, gait, balance goals in effect when clearance provided from MD)    Goals Current/Discharge status  Met   Short term goal 1: Pt will be indep with HEP for ROM   I w/ HEP for ROM _ yes  [] no   Short term goal 2: Pt will demonstrate L foot/ankle motion that is [facility] [place] and pain free   AROM LLE (degrees)  LLE General AROM: DF 6  w/ knee extended;  PF 44; , inv 36, ev 22       [x] yes  [] no   Short term goal 3: Pt will complete 5 L SL heel raises indep   Unable to L  SL Heel raise [] yes  [x] no   Short term goal 4: LEFS will inc to >/= 39/80 to  demonstrate improved lower extremity function  46/80 [x] yes  [] no   Short term goal 5: Pt will demonstrate at least 4/5 strength in L foot/ankle  Strength LLE  Comment: DF, PF, EV,INV 5/5;     [x]  yes  []  no      Long Term Goals -    Goals Current/ Discharge status Met   Long term goal 1: Pt will amb .5 mile without AD or boot with smooth, reciprocal pattern without incraesed in pain Pt reports amb >/= 0.5 miles this weekend w/ altered gait pattern d/t heel lift [] yes  [x] no   Long term goal 2: Pt will maintain L SL stance for 30 seconds on level and compliant surface indep NT d/t time [] yes  [] no   Long term goal 3:  Inc L ankle AROM to >/= 10 deg DF, 50 deg PF to improve erna to prolonged walking See above  [] yes  [x] no   Long term goal 4: Pt will inc LEFS to >/= 60/80 See above [] yes  [x] no         Body structures, Functions, Activity limitations: Decreased functional mobility , Decreased ROM, Decreased strength, Decreased balance, Decreased high-level IADLs     Assessment: Pt met ankle strength goals, improved ankle AROM and reported some improvement in sensitivity and pain in L foot with [employer] however pt cont to demo swelling through distal lower leg and ankle; Pt did not return after 12th visit on 1-2-18;  D/C           PLAN: D/C                    Patient Status:[] Continue/ Initiate plan of Care                          [x] Discharge PT. Recommend pt continue with HEP.                            [] Additional visits requested, Please re-certify for additional visits:                                                    Electronically signed by Jimmy Lange PT on 3/22/2018 at 8:22 AM

## 2019-02-15 ENCOUNTER — OFFICE VISIT (OUTPATIENT)
Dept: FAMILY MEDICINE CLINIC | Age: 54
End: 2019-02-15

## 2019-02-15 ENCOUNTER — HOSPITAL ENCOUNTER (EMERGENCY)
Age: 54
Discharge: HOME | End: 2019-02-15
Payer: COMMERCIAL

## 2019-02-15 ENCOUNTER — APPOINTMENT (OUTPATIENT)
Dept: GENERAL RADIOLOGY | Age: 54
End: 2019-02-15
Payer: COMMERCIAL

## 2019-02-15 VITALS
HEART RATE: 101 BPM | WEIGHT: 231 LBS | DIASTOLIC BLOOD PRESSURE: 80 MMHG | SYSTOLIC BLOOD PRESSURE: 118 MMHG | BODY MASS INDEX: 28.72 KG/M2 | HEIGHT: 75 IN | OXYGEN SATURATION: 97 %

## 2019-02-15 VITALS
OXYGEN SATURATION: 96 % | TEMPERATURE: 97.6 F | HEART RATE: 100 BPM | DIASTOLIC BLOOD PRESSURE: 92 MMHG | BODY MASS INDEX: 28.88 KG/M2 | HEIGHT: 74 IN | SYSTOLIC BLOOD PRESSURE: 152 MMHG | WEIGHT: 225 LBS | RESPIRATION RATE: 18 BRPM

## 2019-02-15 DIAGNOSIS — Z51.89 VISIT FOR WOUND CHECK: Primary | ICD-10-CM

## 2019-02-15 DIAGNOSIS — S61.217A LACERATION OF LEFT LITTLE FINGER WITHOUT FOREIGN BODY WITHOUT DAMAGE TO NAIL, INITIAL ENCOUNTER: Primary | ICD-10-CM

## 2019-02-15 PROCEDURE — 2500000003 HC RX 250 WO HCPCS: Performed by: PHYSICIAN ASSISTANT

## 2019-02-15 PROCEDURE — 73140 X-RAY EXAM OF FINGER(S): CPT

## 2019-02-15 PROCEDURE — 99282 EMERGENCY DEPT VISIT SF MDM: CPT

## 2019-02-15 PROCEDURE — 90471 IMMUNIZATION ADMIN: CPT | Performed by: PHYSICIAN ASSISTANT

## 2019-02-15 PROCEDURE — 6360000002 HC RX W HCPCS: Performed by: PHYSICIAN ASSISTANT

## 2019-02-15 PROCEDURE — 90715 TDAP VACCINE 7 YRS/> IM: CPT | Performed by: PHYSICIAN ASSISTANT

## 2019-02-15 RX ORDER — CEPHALEXIN 500 MG/1
500 CAPSULE ORAL 4 TIMES DAILY
Qty: 28 CAPSULE | Refills: 0 | Status: SHIPPED | OUTPATIENT
Start: 2019-02-15 | End: 2019-02-22

## 2019-02-15 RX ORDER — LIDOCAINE HYDROCHLORIDE 10 MG/ML
5 INJECTION, SOLUTION EPIDURAL; INFILTRATION; INTRACAUDAL; PERINEURAL ONCE
Status: COMPLETED | OUTPATIENT
Start: 2019-02-15 | End: 2019-02-15

## 2019-02-15 RX ORDER — HYDROCODONE BITARTRATE AND ACETAMINOPHEN 5; 325 MG/1; MG/1
1 TABLET ORAL EVERY 6 HOURS PRN
Qty: 10 TABLET | Refills: 0 | Status: SHIPPED | OUTPATIENT
Start: 2019-02-15 | End: 2019-02-18

## 2019-02-15 RX ORDER — GINSENG 100 MG
CAPSULE ORAL ONCE
Status: DISCONTINUED | OUTPATIENT
Start: 2019-02-15 | End: 2019-02-15 | Stop reason: HOSPADM

## 2019-02-15 RX ADMIN — TETANUS TOXOID, REDUCED DIPHTHERIA TOXOID AND ACELLULAR PERTUSSIS VACCINE, ADSORBED 0.5 ML: 5; 2.5; 8; 8; 2.5 SUSPENSION INTRAMUSCULAR at 10:29

## 2019-02-15 RX ADMIN — LIDOCAINE HYDROCHLORIDE 5 ML: 10 INJECTION, SOLUTION EPIDURAL; INFILTRATION; INTRACAUDAL; PERINEURAL at 10:31

## 2019-02-15 ASSESSMENT — PAIN DESCRIPTION - ORIENTATION: ORIENTATION: LEFT

## 2019-02-15 ASSESSMENT — PAIN SCALES - WONG BAKER: WONGBAKER_NUMERICALRESPONSE: 2

## 2019-02-15 ASSESSMENT — PAIN DESCRIPTION - LOCATION: LOCATION: FINGER (COMMENT WHICH ONE)

## 2019-02-15 ASSESSMENT — PAIN DESCRIPTION - PAIN TYPE: TYPE: ACUTE PAIN

## 2019-02-15 ASSESSMENT — PATIENT HEALTH QUESTIONNAIRE - PHQ9
SUM OF ALL RESPONSES TO PHQ9 QUESTIONS 1 & 2: 0
1. LITTLE INTEREST OR PLEASURE IN DOING THINGS: 0
SUM OF ALL RESPONSES TO PHQ QUESTIONS 1-9: 0
SUM OF ALL RESPONSES TO PHQ QUESTIONS 1-9: 0
2. FEELING DOWN, DEPRESSED OR HOPELESS: 0

## 2019-02-15 ASSESSMENT — PAIN DESCRIPTION - FREQUENCY: FREQUENCY: CONTINUOUS

## 2019-02-15 ASSESSMENT — PAIN SCALES - GENERAL: PAINLEVEL_OUTOF10: 4

## 2019-02-15 ASSESSMENT — PAIN DESCRIPTION - DESCRIPTORS: DESCRIPTORS: CONSTANT

## 2019-02-15 ASSESSMENT — PAIN DESCRIPTION - PROGRESSION: CLINICAL_PROGRESSION: NOT CHANGED

## 2019-02-15 ASSESSMENT — PAIN DESCRIPTION - ONSET: ONSET: SUDDEN

## 2019-09-11 NOTE — PROGRESS NOTES
[address] [address]  Outpatient Physical Therapy    Treatment Note        Date: 2017  Patient: Edgardo Abdi  : 1965  ACCT #: [de-identified]  Referring Practitioner: Dr. Donna Sanchez  Diagnosis: Achilles tendon rupture     Visit Information:  PT Visit Information  PT Insurance Information: [employer] P[employer]  Total # of Visits to Date: 11  Progress Note Counter:      Subjective: Used ace bandabe and wrapped from mid foot , left heel open and up to above the ankle and had no swelling below or above in the am even with after walking the dog- also did minimal standing yesterday. HEP Compliance:  [x] Good [] Fair [] Poor [] Reports not doing due to:    Vital Signs  Patient Currently in Pain: Yes (if nothing is touching it)   Pain Screening  Patient Currently in Pain: Yes (if nothing is touching it)  Pain Assessment  Pain Assessment: 0-10  Pain Level: 4  Pain Type: Chronic pain  Pain Location: Foot  Pain Orientation: Left; Outer  Pain Descriptors: Burning;Numbness;Tingling    OBJECTIVE:   Exercises  Exercise 10: standing HR, Assisted L HR w/ controlled L eccentric x 20                Manual:   Manual therapy  PROM: PROM DF- PF w/ stretching into DF   Soft Tissue Mobalization: DTM though Gastroc / Soleus complex, achilles,ankle, lateral foot  Total time 17 mins   Other:  5 minsIDN to normalize inflammation, dec pain and balance MS mechanics ( see DN diagram for sites needled that will be scanned into EMR upon D/C)    Modalities:  Modalities  E-stim (parameters): DN ENS at 5.5 Hz x 10 mins through 3 channels ( see diagram)w/ adjuctive DN to L sural , tibial HPs , B saphenous during Tx to normalize inflammation, dec pain and balance MS mechanics ( see DN diagram for sites needled that will be scanned into EMR upon D/C)     *Indicates exercise, modality, or manual techniques to be initiated when appropriate    Assessment:         Body structures, Functions, Activity limitations: Decreased functional Detail Level: Simple Add 53505 Cpt? (Important Note: In 2017 The Use Of 23054 Is Being Tracked By Cms To Determine Future Global Period Reimbursement For Global Periods): no mobility , Decreased ROM, Decreased strength, Decreased balance, Decreased high-level IADLs  Assessment: DN ENS performed using 3 channels this date ( one through sural nerve pathway and 2 channels acroos the proximal and distal achilles scar) to normalize inflammation through the sural nerve pathway and dec tension through scar tissue. Treatment Diagnosis: L Ankle Pain ;L Ankle Weakness          Goals:  Short term goals  Time Frame for Short term goals: 5 weeks (ROM, strength, gait, balance goals in effect when clearance provided from MD)  Short term goal 1: Pt will be indep with HEP for ROM   Short term goal 2: Pt will demonstrate L foot[facility]ankle motion [facility] is [facility] and pain free   Short term goal 3: Pt will complete 5 L SL heel raises indep   Short term goal 4: LEFS will inc to >/= 39/80 to  demonstrate improved lower extremity function  Short term goal 5: Pt will demonstrate at least 4/5 strength in L foot/ankle     Long term goals  Long term goal 1: Pt will amb .5 mile without AD or boot with smooth, reciprocal pattern without incraesed in pain  Long term goal 2: Pt will maintain L SL stance for 30 seconds on level and compliant surface indep  Long term goal 3: Inc L ankle AROM to >/= 10 deg DF, 50 deg PF to improve erna to prolonged walking  Long term goal 4: Pt will inc LEFS to >/= 60/80  Progress toward goals:    POST-PAIN       Pain Rating (0-10 pain scale): 2  /10   Location and pain description same as pre-treatment unless indicated.    Action: [] NA   [x] Perform HEP  [] Meds as prescribed  [] Modalities as prescribed   [] Call Physician     Frequency/Duration:  Plan  Times per week: 1-2  Plan weeks: 4 for additional 8 visits to origianl 10  Current Treatment Recommendations: ROM, Balance Training (no DF past neutral until after pt follow up on 10/26, strengthening, ROM, activity restrictions per MD guidelines)  Plan Comment: Cont w/ skilled care to progress L PF strength gait and L SLS stability      Pt to continue current HEP. See objective section for any therapeutic exercise changes, additions or modifications this date.          PT Individual Minutes  Time In: 8505  Time Out: 3856  Minutes: 39  Timed Code Treatment Minutes: 39 Minutes  Procedure Minutes:0    Signature:  Electronically signed by Heike Gomez PT on 12/28/17 at 9:51 AM

## 2019-09-18 ENCOUNTER — HOSPITAL ENCOUNTER (OUTPATIENT)
Dept: GENERAL RADIOLOGY | Age: 54
Discharge: HOME | End: 2019-09-20
Payer: COMMERCIAL

## 2019-09-18 DIAGNOSIS — M25.511 PAIN IN JOINT OF RIGHT SHOULDER: ICD-10-CM

## 2019-09-18 PROCEDURE — 73030 X-RAY EXAM OF SHOULDER: CPT

## 2019-10-16 ENCOUNTER — HOSPITAL ENCOUNTER (OUTPATIENT)
Dept: GENERAL RADIOLOGY | Age: 54
Discharge: HOME | End: 2019-10-18
Payer: COMMERCIAL

## 2019-10-16 ENCOUNTER — HOSPITAL ENCOUNTER (OUTPATIENT)
Dept: MRI IMAGING | Age: 54
Discharge: HOME | End: 2019-10-18
Payer: COMMERCIAL

## 2019-10-16 VITALS — DIASTOLIC BLOOD PRESSURE: 106 MMHG | HEART RATE: 93 BPM | SYSTOLIC BLOOD PRESSURE: 137 MMHG

## 2019-10-16 DIAGNOSIS — S43.421A SPRAIN OF RIGHT ROTATOR CUFF CAPSULE, INITIAL ENCOUNTER: ICD-10-CM

## 2019-10-16 DIAGNOSIS — S43.421S SPRAIN OF RIGHT ROTATOR CUFF CAPSULE, SEQUELA: ICD-10-CM

## 2019-10-16 PROCEDURE — 2580000003 HC RX 258: Performed by: ORTHOPAEDIC SURGERY

## 2019-10-16 PROCEDURE — 20610 DRAIN/INJ JOINT/BURSA W/O US: CPT

## 2019-10-16 PROCEDURE — 73222 MRI JOINT UPR EXTREM W/DYE: CPT

## 2019-10-16 PROCEDURE — A9579 GAD-BASE MR CONTRAST NOS,1ML: HCPCS | Performed by: ORTHOPAEDIC SURGERY

## 2019-10-16 PROCEDURE — 2500000003 HC RX 250 WO HCPCS: Performed by: ORTHOPAEDIC SURGERY

## 2019-10-16 PROCEDURE — 6360000004 HC RX CONTRAST MEDICATION: Performed by: ORTHOPAEDIC SURGERY

## 2019-10-16 RX ORDER — 0.9 % SODIUM CHLORIDE 0.9 %
10 VIAL (ML) INJECTION 2 TIMES DAILY
Status: DISCONTINUED | OUTPATIENT
Start: 2019-10-16 | End: 2019-10-19 | Stop reason: HOSPADM

## 2019-10-16 RX ORDER — LIDOCAINE HYDROCHLORIDE 20 MG/ML
10 INJECTION, SOLUTION INFILTRATION; PERINEURAL ONCE
Status: COMPLETED | OUTPATIENT
Start: 2019-10-16 | End: 2019-10-16

## 2019-10-16 RX ADMIN — LIDOCAINE HYDROCHLORIDE 12 ML: 20 INJECTION, SOLUTION INFILTRATION; PERINEURAL at 08:36

## 2019-10-16 RX ADMIN — SODIUM CHLORIDE 7 ML: 9 INJECTION, SOLUTION INTRAMUSCULAR; INTRAVENOUS; SUBCUTANEOUS at 08:36

## 2019-10-16 RX ADMIN — GADOTERIDOL 0.1 MMOL: 279.3 INJECTION, SOLUTION INTRAVENOUS at 08:36

## 2019-10-16 RX ADMIN — IOVERSOL 10 ML: 678 INJECTION INTRA-ARTERIAL; INTRAVENOUS at 09:36

## 2019-10-16 ASSESSMENT — PAIN SCALES - GENERAL: PAINLEVEL_OUTOF10: 1

## 2019-10-23 ENCOUNTER — HOSPITAL ENCOUNTER (OUTPATIENT)
Dept: NEUROLOGY | Age: 54
Discharge: HOME | End: 2019-10-23
Payer: COMMERCIAL

## 2019-10-23 PROCEDURE — 95886 MUSC TEST DONE W/N TEST COMP: CPT

## 2019-10-23 PROCEDURE — 95910 NRV CNDJ TEST 7-8 STUDIES: CPT

## 2019-10-31 ENCOUNTER — HOSPITAL ENCOUNTER (OUTPATIENT)
Dept: GENERAL RADIOLOGY | Age: 54
Discharge: HOME | End: 2019-11-02
Payer: COMMERCIAL

## 2019-10-31 DIAGNOSIS — R52 PAIN: ICD-10-CM

## 2019-10-31 PROCEDURE — 20610 DRAIN/INJ JOINT/BURSA W/O US: CPT

## 2019-10-31 PROCEDURE — 6360000002 HC RX W HCPCS: Performed by: ORTHOPAEDIC SURGERY

## 2019-10-31 PROCEDURE — 6360000004 HC RX CONTRAST MEDICATION: Performed by: ORTHOPAEDIC SURGERY

## 2019-10-31 PROCEDURE — 2500000003 HC RX 250 WO HCPCS: Performed by: ORTHOPAEDIC SURGERY

## 2019-10-31 RX ORDER — LIDOCAINE HYDROCHLORIDE 20 MG/ML
10 INJECTION, SOLUTION INFILTRATION; PERINEURAL ONCE
Status: COMPLETED | OUTPATIENT
Start: 2019-10-31 | End: 2019-10-31

## 2019-10-31 RX ORDER — BUPIVACAINE HYDROCHLORIDE 5 MG/ML
10 INJECTION, SOLUTION EPIDURAL; INTRACAUDAL ONCE
Status: COMPLETED | OUTPATIENT
Start: 2019-10-31 | End: 2019-10-31

## 2019-10-31 RX ORDER — TRIAMCINOLONE ACETONIDE 40 MG/ML
80 INJECTION, SUSPENSION INTRA-ARTICULAR; INTRAMUSCULAR ONCE
Status: COMPLETED | OUTPATIENT
Start: 2019-10-31 | End: 2019-10-31

## 2019-10-31 RX ADMIN — TRIAMCINOLONE ACETONIDE 80 MG: 40 INJECTION, SUSPENSION INTRA-ARTICULAR; INTRAMUSCULAR at 15:13

## 2019-10-31 RX ADMIN — IOVERSOL 5 ML: 678 INJECTION INTRA-ARTERIAL; INTRAVENOUS at 15:12

## 2019-10-31 RX ADMIN — BUPIVACAINE HYDROCHLORIDE 3 MG: 5 INJECTION, SOLUTION EPIDURAL; INTRACAUDAL; PERINEURAL at 15:12

## 2019-10-31 RX ADMIN — LIDOCAINE HYDROCHLORIDE 10 ML: 20 INJECTION, SOLUTION INFILTRATION; PERINEURAL at 15:13

## 2021-03-26 ENCOUNTER — OFFICE VISIT (OUTPATIENT)
Dept: FAMILY MEDICINE CLINIC | Age: 56
End: 2021-03-26
Payer: COMMERCIAL

## 2021-03-26 VITALS
TEMPERATURE: 98.2 F | HEART RATE: 85 BPM | BODY MASS INDEX: 28.77 KG/M2 | WEIGHT: 224.2 LBS | OXYGEN SATURATION: 98 % | DIASTOLIC BLOOD PRESSURE: 100 MMHG | HEIGHT: 74 IN | SYSTOLIC BLOOD PRESSURE: 160 MMHG

## 2021-03-26 DIAGNOSIS — M54.50 ACUTE MIDLINE LOW BACK PAIN WITHOUT SCIATICA: Primary | ICD-10-CM

## 2021-03-26 PROCEDURE — 99213 OFFICE O/P EST LOW 20 MIN: CPT | Performed by: FAMILY MEDICINE

## 2021-03-26 RX ORDER — METHYLPREDNISOLONE 4 MG/1
TABLET ORAL
Qty: 1 KIT | Refills: 0 | Status: SHIPPED | OUTPATIENT
Start: 2021-03-26

## 2021-03-26 RX ORDER — CYCLOBENZAPRINE HCL 10 MG
10 TABLET ORAL 3 TIMES DAILY PRN
Qty: 30 TABLET | Refills: 0 | Status: SHIPPED | OUTPATIENT
Start: 2021-03-26 | End: 2021-04-05

## 2021-03-26 RX ORDER — HYDROCODONE BITARTRATE AND ACETAMINOPHEN 5; 325 MG/1; MG/1
1 TABLET ORAL EVERY 6 HOURS PRN
Qty: 12 TABLET | Refills: 0 | Status: SHIPPED | OUTPATIENT
Start: 2021-03-26 | End: 2021-03-29

## 2021-03-26 SDOH — ECONOMIC STABILITY: FOOD INSECURITY: WITHIN THE PAST 12 MONTHS, YOU WORRIED THAT YOUR FOOD WOULD RUN OUT BEFORE YOU GOT MONEY TO BUY MORE.: NEVER TRUE

## 2021-03-26 SDOH — ECONOMIC STABILITY: TRANSPORTATION INSECURITY
IN THE PAST 12 MONTHS, HAS LACK OF TRANSPORTATION KEPT YOU FROM MEETINGS, WORK, OR FROM GETTING THINGS NEEDED FOR DAILY LIVING?: NO

## 2021-03-26 SDOH — ECONOMIC STABILITY: TRANSPORTATION INSECURITY
IN THE PAST 12 MONTHS, HAS THE LACK OF TRANSPORTATION KEPT YOU FROM MEDICAL APPOINTMENTS OR FROM GETTING MEDICATIONS?: NO

## 2021-03-26 SDOH — ECONOMIC STABILITY: INCOME INSECURITY: HOW HARD IS IT FOR YOU TO PAY FOR THE VERY BASICS LIKE FOOD, HOUSING, MEDICAL CARE, AND HEATING?: NOT HARD AT ALL

## 2021-03-26 ASSESSMENT — PATIENT HEALTH QUESTIONNAIRE - PHQ9
SUM OF ALL RESPONSES TO PHQ QUESTIONS 1-9: 1
SUM OF ALL RESPONSES TO PHQ QUESTIONS 1-9: 1
2. FEELING DOWN, DEPRESSED OR HOPELESS: 1

## 2021-03-26 NOTE — PROGRESS NOTES
Subjective  Chief Complaint   Patient presents with    Back Pain     x Sunday, hard time getting out of bed, no known injury, working in yard    Hypertension     discuss heart rate and bp        Florencia Carrasco is a 60 y.o. male    Back Pain  Patient presents for evaluation of low back problems. Symptoms have been present for 5-6 days and include pain in lumbosacral region   Initial inciting event: yard work/bending. Alleviating factors identifiable by the patient are advil  Taking 800mg TID     No ice/heat    Aggravating factors identifiable by the patient are bending over     No history of chronic back issues     Is able to get to sleep  Hurts when rolls over     bp was 127/78 when donating blood just a few weeks ago     Patient's medications, allergies, past medical, surgical, social and family histories were reviewed and updated as appropriate. ROS  Constitutional: negative for chills, fevers, malaise and sweats  Respiratory: negative for cough, dyspnea on exertion and shortness of breath  Cardiovascular: negative for chest pain and palpitations  Musculoskeletal:positive for back pain and negative for muscle weakness  Neurological: negative for coordination problems, gait problems, tremors, no bowel or bladder incontinence, no saddle anesthesia    Objective  BP (!) 160/100 (Site: Right Lower Arm, Position: Sitting, Cuff Size: Medium Adult)   Pulse 85   Temp 98.2 °F (36.8 °C)   Ht 6' 2\" (1.88 m)   Wt 224 lb 3.2 oz (101.7 kg)   SpO2 98%   BMI 28.79 kg/m²     Sits comfortably in chair   Slow to rise from chair   Pain with minimal flexion   Able to rotate     alert, oriented, normal speech, no focal findings or movement disorder noted, motor and sensory grossly normal bilaterally, normal muscle tone, no tremors, strength 5/5    Assessment   Diagnosis Orders   1.  Acute midline low back pain without sciatica  XR LUMBAR SPINE (MIN 4 VIEWS)    methylPREDNISolone (MEDROL DOSEPACK) 4 MG tablet

## 2021-06-07 ENCOUNTER — TELEPHONE (OUTPATIENT)
Dept: FAMILY MEDICINE CLINIC | Age: 56
End: 2021-06-07

## 2021-06-07 DIAGNOSIS — Z20.822 COVID-19 RULED OUT: Primary | ICD-10-CM

## 2021-06-07 NOTE — TELEPHONE ENCOUNTER
Pt spouse calling. Traveling to [facility] soon, and needs PCR covid test before flying. Please order.    Pt ph. [phone]

## 2021-06-14 ENCOUNTER — NURSE ONLY (OUTPATIENT)
Dept: FAMILY MEDICINE CLINIC | Age: 56
End: 2021-06-14

## 2021-06-14 DIAGNOSIS — Z20.822 COVID-19 RULED OUT: ICD-10-CM

## 2021-06-14 DIAGNOSIS — Z20.822 ENCOUNTER FOR LABORATORY TESTING FOR COVID-19 VIRUS: Primary | ICD-10-CM

## 2021-06-15 LAB — SARS-COV-2, PCR: NOT DETECTED

## 2023-01-04 ENCOUNTER — HOSPITAL ENCOUNTER (EMERGENCY)
Age: 58
Discharge: HOME | End: 2023-01-05
Attending: EMERGENCY MEDICINE
Payer: COMMERCIAL

## 2023-01-04 DIAGNOSIS — N20.1 URETERIC STONE: Primary | ICD-10-CM

## 2023-01-04 PROCEDURE — 2580000003 HC RX 258: Performed by: EMERGENCY MEDICINE

## 2023-01-04 PROCEDURE — 6360000002 HC RX W HCPCS: Performed by: EMERGENCY MEDICINE

## 2023-01-04 PROCEDURE — 96375 TX/PRO/DX INJ NEW DRUG ADDON: CPT

## 2023-01-04 RX ORDER — 0.9 % SODIUM CHLORIDE 0.9 %
1000 INTRAVENOUS SOLUTION INTRAVENOUS ONCE
Status: COMPLETED | OUTPATIENT
Start: 2023-01-04 | End: 2023-01-05

## 2023-01-04 RX ORDER — ONDANSETRON 2 MG/ML
4 INJECTION INTRAMUSCULAR; INTRAVENOUS ONCE
Status: COMPLETED | OUTPATIENT
Start: 2023-01-04 | End: 2023-01-04

## 2023-01-04 RX ORDER — MORPHINE SULFATE 4 MG/ML
6 INJECTION, SOLUTION INTRAMUSCULAR; INTRAVENOUS ONCE
Status: COMPLETED | OUTPATIENT
Start: 2023-01-04 | End: 2023-01-05

## 2023-01-04 RX ADMIN — ONDANSETRON 4 MG: 2 INJECTION INTRAMUSCULAR; INTRAVENOUS at 23:59

## 2023-01-04 RX ADMIN — SODIUM CHLORIDE 1000 ML: 9 INJECTION, SOLUTION INTRAVENOUS at 23:58

## 2023-01-04 ASSESSMENT — PAIN - FUNCTIONAL ASSESSMENT: PAIN_FUNCTIONAL_ASSESSMENT: 0-10

## 2023-01-04 ASSESSMENT — PAIN DESCRIPTION - FREQUENCY: FREQUENCY: CONTINUOUS

## 2023-01-04 ASSESSMENT — PAIN DESCRIPTION - PAIN TYPE: TYPE: ACUTE PAIN

## 2023-01-04 ASSESSMENT — PAIN SCALES - GENERAL: PAINLEVEL_OUTOF10: 10

## 2023-01-04 ASSESSMENT — PAIN DESCRIPTION - LOCATION: LOCATION: ABDOMEN

## 2023-01-04 ASSESSMENT — PAIN DESCRIPTION - ORIENTATION: ORIENTATION: RIGHT

## 2023-01-04 ASSESSMENT — PAIN DESCRIPTION - DESCRIPTORS: DESCRIPTORS: SHARP

## 2023-01-05 ENCOUNTER — APPOINTMENT (OUTPATIENT)
Dept: GENERAL RADIOLOGY | Age: 58
End: 2023-01-05
Payer: COMMERCIAL

## 2023-01-05 ENCOUNTER — APPOINTMENT (OUTPATIENT)
Dept: CT IMAGING | Age: 58
End: 2023-01-05
Payer: COMMERCIAL

## 2023-01-05 VITALS
OXYGEN SATURATION: 99 % | HEIGHT: 74 IN | TEMPERATURE: 97.8 F | DIASTOLIC BLOOD PRESSURE: 89 MMHG | HEART RATE: 99 BPM | RESPIRATION RATE: 18 BRPM | BODY MASS INDEX: 27.59 KG/M2 | WEIGHT: 215 LBS | SYSTOLIC BLOOD PRESSURE: 162 MMHG

## 2023-01-05 LAB
ALBUMIN SERPL-MCNC: 4.2 G/DL (ref 3.5–4.6)
ALP BLD-CCNC: 51 U/L (ref 35–104)
ALT SERPL-CCNC: 27 U/L (ref 0–41)
ANION GAP SERPL CALCULATED.3IONS-SCNC: 16 MEQ/L (ref 9–15)
AST SERPL-CCNC: 27 U/L (ref 0–40)
BASOPHILS ABSOLUTE: 0.1 K/UL (ref 0–0.2)
BASOPHILS RELATIVE PERCENT: 0.9 %
BILIRUB SERPL-MCNC: 0.5 MG/DL (ref 0.2–0.7)
BILIRUBIN URINE: NEGATIVE
BLOOD, URINE: NEGATIVE
BUN BLDV-MCNC: 19 MG/DL (ref 6–20)
CALCIUM SERPL-MCNC: 9.8 MG/DL (ref 8.5–9.9)
CHLORIDE BLD-SCNC: 99 MEQ/L (ref 95–107)
CHP ED QC CHECK: NORMAL
CLARITY: CLEAR
CO2: 26 MEQ/L (ref 20–31)
COLOR: YELLOW
CREAT SERPL-MCNC: 1.13 MG/DL (ref 0.7–1.2)
EKG ATRIAL RATE: 86 BPM
EKG P AXIS: 56 DEGREES
EKG P-R INTERVAL: 188 MS
EKG Q-T INTERVAL: 368 MS
EKG QRS DURATION: 68 MS
EKG QTC CALCULATION (BAZETT): 440 MS
EKG R AXIS: 28 DEGREES
EKG T AXIS: 4 DEGREES
EKG VENTRICULAR RATE: 86 BPM
EOSINOPHILS ABSOLUTE: 0.2 K/UL (ref 0–0.7)
EOSINOPHILS RELATIVE PERCENT: 2.1 %
GFR SERPL CREATININE-BSD FRML MDRD: >60 ML/MIN/{1.73_M2}
GLOBULIN: 2.7 G/DL (ref 2.3–3.5)
GLUCOSE BLD-MCNC: 112 MG/DL (ref 70–99)
GLUCOSE BLD-MCNC: 117 MG/DL
GLUCOSE BLD-MCNC: 117 MG/DL (ref 70–99)
GLUCOSE URINE: NEGATIVE MG/DL
HCT VFR BLD CALC: 48.4 % (ref 42–52)
HEMOGLOBIN: 16.7 G/DL (ref 14–18)
KETONES, URINE: NEGATIVE MG/DL
LEUKOCYTE ESTERASE, URINE: NEGATIVE
LIPASE: 35 U/L (ref 12–95)
LYMPHOCYTES ABSOLUTE: 2.8 K/UL (ref 1–4.8)
LYMPHOCYTES RELATIVE PERCENT: 28.1 %
MCH RBC QN AUTO: 33.4 PG (ref 27–31.3)
MCHC RBC AUTO-ENTMCNC: 34.5 % (ref 33–37)
MCV RBC AUTO: 97 FL (ref 79–92.2)
MONOCYTES ABSOLUTE: 0.9 K/UL (ref 0.2–0.8)
MONOCYTES RELATIVE PERCENT: 9.3 %
NEUTROPHILS ABSOLUTE: 5.9 K/UL (ref 1.4–6.5)
NEUTROPHILS RELATIVE PERCENT: 59.6 %
NITRITE, URINE: NEGATIVE
PDW BLD-RTO: 12 % (ref 11.5–14.5)
PERFORMED ON: ABNORMAL
PH UA: 8.5 (ref 5–9)
PLATELET # BLD: 235 K/UL (ref 130–400)
POC CREATININE WHOLE BLOOD: 1.4
POTASSIUM SERPL-SCNC: 3.9 MEQ/L (ref 3.4–4.9)
PROTEIN UA: NEGATIVE MG/DL
RBC # BLD: 4.99 M/UL (ref 4.7–6.1)
SODIUM BLD-SCNC: 141 MEQ/L (ref 135–144)
SPECIFIC GRAVITY UA: 1.05 (ref 1–1.03)
TOTAL PROTEIN: 6.9 G/DL (ref 6.3–8)
URINE REFLEX TO CULTURE: NORMAL
UROBILINOGEN, URINE: 0.2 E.U./DL
WBC # BLD: 9.9 K/UL (ref 4.8–10.8)

## 2023-01-05 PROCEDURE — 6360000004 HC RX CONTRAST MEDICATION: Performed by: EMERGENCY MEDICINE

## 2023-01-05 PROCEDURE — 81003 URINALYSIS AUTO W/O SCOPE: CPT

## 2023-01-05 PROCEDURE — 71275 CT ANGIOGRAPHY CHEST: CPT

## 2023-01-05 PROCEDURE — 71045 X-RAY EXAM CHEST 1 VIEW: CPT

## 2023-01-05 PROCEDURE — 2580000003 HC RX 258: Performed by: EMERGENCY MEDICINE

## 2023-01-05 PROCEDURE — 96374 THER/PROPH/DIAG INJ IV PUSH: CPT

## 2023-01-05 PROCEDURE — 6360000002 HC RX W HCPCS: Performed by: EMERGENCY MEDICINE

## 2023-01-05 PROCEDURE — 74177 CT ABD & PELVIS W/CONTRAST: CPT

## 2023-01-05 PROCEDURE — 99285 EMERGENCY DEPT VISIT HI MDM: CPT

## 2023-01-05 PROCEDURE — 36415 COLL VENOUS BLD VENIPUNCTURE: CPT

## 2023-01-05 PROCEDURE — 83690 ASSAY OF LIPASE: CPT

## 2023-01-05 PROCEDURE — 80053 COMPREHEN METABOLIC PANEL: CPT

## 2023-01-05 PROCEDURE — 85025 COMPLETE CBC W/AUTO DIFF WBC: CPT

## 2023-01-05 RX ORDER — PROMETHAZINE HYDROCHLORIDE 25 MG/1
25 SUPPOSITORY RECTAL 2 TIMES DAILY PRN
Qty: 15 SUPPOSITORY | Refills: 0 | Status: SHIPPED | OUTPATIENT
Start: 2023-01-05 | End: 2023-01-12

## 2023-01-05 RX ORDER — ONDANSETRON 4 MG/1
4 TABLET, ORALLY DISINTEGRATING ORAL 3 TIMES DAILY PRN
Qty: 21 TABLET | Refills: 0 | Status: SHIPPED | OUTPATIENT
Start: 2023-01-05

## 2023-01-05 RX ORDER — OXYCODONE HYDROCHLORIDE AND ACETAMINOPHEN 5; 325 MG/1; MG/1
1 TABLET ORAL EVERY 6 HOURS PRN
Qty: 12 TABLET | Refills: 0 | Status: SHIPPED | OUTPATIENT
Start: 2023-01-05 | End: 2023-01-08

## 2023-01-05 RX ORDER — LORAZEPAM 2 MG/ML
1 INJECTION INTRAMUSCULAR ONCE
Status: COMPLETED | OUTPATIENT
Start: 2023-01-05 | End: 2023-01-05

## 2023-01-05 RX ORDER — KETOROLAC TROMETHAMINE 10 MG/1
10 TABLET, FILM COATED ORAL EVERY 6 HOURS PRN
Qty: 20 TABLET | Refills: 0 | Status: SHIPPED | OUTPATIENT
Start: 2023-01-05

## 2023-01-05 RX ORDER — KETOROLAC TROMETHAMINE 15 MG/ML
15 INJECTION, SOLUTION INTRAMUSCULAR; INTRAVENOUS ONCE
Status: COMPLETED | OUTPATIENT
Start: 2023-01-05 | End: 2023-01-05

## 2023-01-05 RX ORDER — 0.9 % SODIUM CHLORIDE 0.9 %
1000 INTRAVENOUS SOLUTION INTRAVENOUS ONCE
Status: COMPLETED | OUTPATIENT
Start: 2023-01-05 | End: 2023-01-05

## 2023-01-05 RX ORDER — PROCHLORPERAZINE EDISYLATE 5 MG/ML
10 INJECTION INTRAMUSCULAR; INTRAVENOUS ONCE
Status: COMPLETED | OUTPATIENT
Start: 2023-01-05 | End: 2023-01-05

## 2023-01-05 RX ADMIN — KETOROLAC TROMETHAMINE 15 MG: 15 INJECTION, SOLUTION INTRAMUSCULAR; INTRAVENOUS at 01:16

## 2023-01-05 RX ADMIN — PROCHLORPERAZINE EDISYLATE 10 MG: 5 INJECTION INTRAMUSCULAR; INTRAVENOUS at 01:16

## 2023-01-05 RX ADMIN — LORAZEPAM 1 MG: 2 INJECTION INTRAMUSCULAR; INTRAVENOUS at 00:22

## 2023-01-05 RX ADMIN — HYDROMORPHONE HYDROCHLORIDE 1 MG: 1 INJECTION, SOLUTION INTRAMUSCULAR; INTRAVENOUS; SUBCUTANEOUS at 01:18

## 2023-01-05 RX ADMIN — SODIUM CHLORIDE 1000 ML: 9 INJECTION, SOLUTION INTRAVENOUS at 01:15

## 2023-01-05 RX ADMIN — IOPAMIDOL 50 ML: 612 INJECTION, SOLUTION INTRAVENOUS at 00:53

## 2023-01-05 RX ADMIN — MORPHINE SULFATE 6 MG: 4 INJECTION INTRAVENOUS at 00:00

## 2023-01-05 ASSESSMENT — ENCOUNTER SYMPTOMS
VOMITING: 0
ABDOMINAL PAIN: 1
SHORTNESS OF BREATH: 0

## 2023-01-05 ASSESSMENT — PAIN DESCRIPTION - LOCATION: LOCATION: ABDOMEN;GROIN

## 2023-01-05 ASSESSMENT — PAIN DESCRIPTION - DESCRIPTORS: DESCRIPTORS: ACHING

## 2023-01-05 ASSESSMENT — PAIN DESCRIPTION - ORIENTATION: ORIENTATION: RIGHT

## 2023-01-05 ASSESSMENT — PAIN SCALES - GENERAL: PAINLEVEL_OUTOF10: 5

## 2023-01-05 NOTE — ED TRIAGE NOTES
Pt c/o nausea a few hours ago and then sharp shooting pain into RLQ and groin, slightly into right flank. Pt is anxious and screaming on arrival skin is pink but diaphoretic.  Pt taken straight back to ER #[id number] for triage and eval.

## 2023-01-05 NOTE — ED PROVIDER NOTES
[facility] ED  EMERGENCY DEPARTMENT ENCOUNTER      Pt Name: Sanam De La Cruz  MRN: [id number]  Jesseegfsharee 1965  Date of evaluation: 1/4/2023  Provider: Lyn Churchill 3069       Chief Complaint   Patient presents with    Abdominal Pain     PT C/O RLQ PAIN FOR A FEW HOURS         HISTORY OF PRESENT ILLNESS   (Location/Symptom, Timing/Onset, Context/Setting, Quality, Duration, Modifying Factors, Severity)  Note limiting factors. Sanam De La Cruz is a 59 y.o. male who presents to the emergency department via private vehicle for evaluation of abdominal pain. History of internal hemorrhoid, low back pain, acid reflux. Denies previous history of intra-abdominal surgeries or kidney stones. Reports that he was feeling fine yesterday, having normal bowel movements. States that today, in the evening and for the past couple of hours he was having right lower quadrant abdominal pain radiating into his groin and wrapping around to his lower back with difficulty urination. Associated nausea. He denies any traumatic injury. He took some [place] without significant improvement. No fever, neck or jaw pain, chest pain or difficulty breathing, upper back pain, vomiting, dysuria, hematuria, blood in stool or constipation. History comes from patient and his wife  HPI    Nursing Notes were reviewed. REVIEW OF SYSTEMS    (2-9 systems for level 4, 10 or more for level 5)     Review of Systems   Constitutional:  Negative for fever. Respiratory:  Negative for shortness of breath. Cardiovascular:  Negative for chest pain. Gastrointestinal:  Positive for abdominal pain. Negative for vomiting. Genitourinary:  Positive for difficulty urinating. Negative for flank pain, penile discharge and scrotal swelling. Musculoskeletal:  Negative for neck pain. Skin:  Negative for rash. Neurological:  Negative for syncope, weakness and numbness.    All other systems reviewed and are negative. Except as noted above the remainder of the review of systems was reviewed and negative. PAST MEDICAL HISTORY     Past Medical History:   Diagnosis Date    C6 radiculopathy 5/30/2015    GERD (gastroesophageal reflux disease)     LBP (low back pain) 5/30/2015    Osteoarthritis     Rotator cuff impingement syndrome          SURGICAL HISTORY       Past Surgical History:   Procedure Laterality Date    COLONOSCOPY      ELBOW ARTHROPLASTY      FRACTURE SURGERY Right     leg- plate    KNEE SURGERY      multiple dates    [address] HISTORY  5/18/12    I&D OF PERIRECTAL ABSCESS    OTHER SURGICAL HISTORY  8/13/12    hemorrhoid band ligation    ID COLON CA SCRN NOT HI RSK IND N/A 5/15/2017    COLONOSCOPY performed by Yary Aldrich MD at [place] [address] Left 8/25/2017    LEFT OPEN ACHILLES REPAIR performed by Minoo Stanley MD at [address] [place] [address]  4 years ago     [facility]       Previous Medications    FAMOTIDINE (PEPCID) 10 MG TABLET    Take 10 mg by mouth daily    METHYLPREDNISOLONE (MEDROL DOSEPACK) 4 MG TABLET    Take by mouth. MULTIPLE VITAMIN (MULTI VITAMIN PO)    Take by mouth    PROBIOTIC PRODUCT (PROBIOTIC-10 PO)    Take by mouth       ALLERGIES     Patient has no known allergies.     FAMILY HISTORY       Family History   Problem Relation Age of Onset    Other Father         alzheimer's    Stroke Maternal Grandfather           SOCIAL HISTORY       Social History     Socioeconomic History    Marital status:    Tobacco Use    Smoking status: Never    Smokeless tobacco: Former   Substance and Sexual Activity    Alcohol use: Yes     Comment: social    Drug use: No       SCREENINGS         [place] Coma Scale  Eye Opening: Spontaneous  Best Verbal Response: Oriented  Best Motor Response: Obeys commands  [place] Coma Scale Score: 15                     CIWA Assessment  BP: (!) 162/89  Heart Rate: 99                 PHYSICAL EXAM    (up to 7 for level 4, 8 or more for level 5)     ED Triage Vitals   BP Temp Temp src Pulse Resp SpO2 Height Weight   -- -- -- -- -- -- -- --       Physical Exam  Vitals reviewed. Constitutional:       Appearance: He is not toxic-appearing. Comments: Patient in mild distress, appears uncomfortable, slightly diaphoretic, anxious appearing   HENT:      Head: Normocephalic and atraumatic. Nose: Nose normal.      Mouth/Throat:      Mouth: Mucous membranes are moist.   Eyes:      General: No scleral icterus. Cardiovascular:      Rate and Rhythm: Normal rate and regular rhythm. Pulses: Normal pulses. Pulmonary:      Effort: Pulmonary effort is normal. No respiratory distress. Breath sounds: Normal breath sounds. No wheezing. Abdominal:      Tenderness: There is abdominal tenderness (RLQ. no rigidity). There is guarding (voluntary). There is no right CVA tenderness, left CVA tenderness or rebound. Hernia: No hernia is present. Genitourinary:     Comments: Chaperone present. Circumcised. No lesions. No scrotal skin change. No high riding testicle. No firm tender testicle. Musculoskeletal:      Cervical back: No rigidity or tenderness. Right lower leg: No edema. Left lower leg: No edema. Skin:     Capillary Refill: Capillary refill takes less than 2 seconds. Findings: No rash. Neurological:      General: No focal deficit present. Mental Status: He is alert and oriented to person, place, and time. Sensory: No sensory deficit. Motor: No weakness.    Psychiatric:         Mood and Affect: Mood normal.       DIAGNOSTIC RESULTS     EKG: All EKG's are interpreted by the Emergency Department Physician who either signs or Co-signs this chart in the absence of a cardiologist.    Normal sinus rhythm, rate 86, normal axis, , no STEMI    RADIOLOGY:   Non-plain film images such as CT, Ultrasound and MRI are read by the radiologist. Plain radiographic images are visualized and preliminarily interpreted by the emergency physician with the below findings:    No aortic dissection or aneurysm. No pulmonary embolism. Interpretation per the Radiologist below, if available at the time of this note:    CT ABDOMEN PELVIS W IV CONTRAST Additional Contrast? None    (Results Pending)   XR CHEST PORTABLE    (Results Pending)   CTA CHEST W WO CONTRAST    (Results Pending)         ED BEDSIDE ULTRASOUND:   Performed by ED Physician - none    LABS:  Labs Reviewed   CBC WITH AUTO DIFFERENTIAL - Abnormal; Notable for the following components:       Result Value    MCV 97.0 (*)     MCH 33.4 (*)     Monocytes Absolute 0.9 (*)     All other components within normal limits   COMPREHENSIVE METABOLIC PANEL - Abnormal; Notable for the following components:    Anion Gap 16 (*)     Glucose 112 (*)     All other components within normal limits   POCT GLUCOSE - Abnormal; Notable for the following components:    POC Glucose 117 (*)     All other components within normal limits   POCT GLUCOSE - Normal   POCT CREATININE - Normal   LIPASE   URINALYSIS WITH REFLEX TO CULTURE       All other labs were within normal range or not returned as of this dictation. EMERGENCY DEPARTMENT COURSE and DIFFERENTIAL DIAGNOSIS/MDM:   Vitals:    Vitals:    01/05/23 0005 01/05/23 0113 01/05/23 0118 01/05/23 0144   BP: (!) 158/89 (!) 168/100  (!) 162/89   Pulse: 85 93  99   Resp: 24 12 18 18   Temp:       SpO2: 100% 96%  99%   Weight:       Height:             Nonischemic ekg-my interpretation, doubt cardiac etiology including ACS  No CP or SOB  No leukocytosis, no transaminitis. Clinically not highly suspicious of testicular torsion-no indication for US. Ureteral obstruction adequately explains presentation  Medical Decision Making  Amount and/or Complexity of Data Reviewed  Labs: ordered. Radiology: ordered.   ECG/medicine tests: ordered. Risk  Prescription drug management. Patient presents the emergency department with abdominal pain, intermittent flank pain. Noted to find on scan hydronephrosis, hydroureter and UVJ calculus approximately 3 mm. No concurrent UTI  Patient given intravenous analgesia, antiemetics, IV fluids. His stone is fairly small and distal  Appendicitis ruled out    REASSESSMENT      Patient is much more comfortable at this time. No intractable pain or vomiting, no renal insufficiency, no concern for urosepsis  Patient comfortable with outpatient follow-up with urology, return precautions for fever or worsening symptoms, no objective criteria for inpatient management  Will be sent home with Toradol, Zofran, Percocet and outpatient follow-up with urology  Nonsurgical abdomen on reassessment, no active vomiting, ambulatory, saturating well on RA      CONSULTS:  None    PROCEDURES:  Unless otherwise noted below, none     Procedures        FINAL IMPRESSION      1. Ureteric stone          DISPOSITION/PLAN   DISPOSITION Decision To Discharge 01/05/2023 01:57:00 AM      PATIENT REFERRED TO:  Sheryl Salgado MD  [address]    Call today      DISCHARGE MEDICATIONS:  New Prescriptions    KETOROLAC (TORADOL) 10 MG TABLET    Take 1 tablet by mouth every 6 hours as needed for Pain    ONDANSETRON (ZOFRAN-ODT) 4 MG DISINTEGRATING TABLET    Take 1 tablet by mouth 3 times daily as needed for Nausea or Vomiting    OXYCODONE-ACETAMINOPHEN (PERCOCET) 5-325 MG PER TABLET    Take 1 tablet by mouth every 6 hours as needed for Pain for up to 3 days. Intended supply: 3 days.  Take lowest dose possible to manage pain Max Daily Amount: 4 tablets    PROMETHAZINE (PROMETHEGAN) 25 MG SUPPOSITORY    Place 1 suppository rectally 2 times daily as needed for Nausea     Controlled Substances Monitoring:     RX Monitoring 4/6/2016   Attestation The Prescription Monitoring Report for this patient was reviewed today. Periodic Controlled Substance Monitoring Possible medication side effects, risk of tolerance and/or dependence, and alternative treatments discussed; No signs of potential drug abuse or diversion identified.        (Please note that portions of this note were completed with a voice recognition program.  Efforts were made to edit the dictations but occasionally words are mis-transcribed.)    Chelly Londono MD (electronically signed)  Attending Emergency Physician            Chelyl Londono MD  01/05/23 0200       Naheed Cronin MD  01/05/23 0210

## 2023-01-05 NOTE — ED NOTES
Patient very uncomfortable during x-ray. Dr. Sona Don made aware. New medication order. Patient medicated prior to ct.       Catalino Shepard RN  01/05/23 0025

## 2023-01-05 NOTE — ED NOTES
Patient given discharge instructions and prescriptions and verbalized understanding. Vital signs stable. Resp even and unlabored. Skin warm, dry and intact. Patient is alert and oriented. IV removed. Patient doesn't have any questions at this time.       Sebastián Peterson RN  01/05/23 0502

## 2023-01-16 ENCOUNTER — OFFICE VISIT (OUTPATIENT)
Dept: SURGERY | Age: 58
End: 2023-01-16
Payer: COMMERCIAL

## 2023-01-16 ENCOUNTER — OFFICE VISIT (OUTPATIENT)
Dept: UROLOGY | Age: 58
End: 2023-01-16
Payer: COMMERCIAL

## 2023-01-16 VITALS
WEIGHT: 215 LBS | HEIGHT: 74 IN | SYSTOLIC BLOOD PRESSURE: 138 MMHG | DIASTOLIC BLOOD PRESSURE: 86 MMHG | BODY MASS INDEX: 27.59 KG/M2 | HEART RATE: 92 BPM | TEMPERATURE: 97.7 F

## 2023-01-16 VITALS
BODY MASS INDEX: 27.59 KG/M2 | DIASTOLIC BLOOD PRESSURE: 100 MMHG | HEART RATE: 85 BPM | SYSTOLIC BLOOD PRESSURE: 138 MMHG | WEIGHT: 215 LBS | HEIGHT: 74 IN

## 2023-01-16 DIAGNOSIS — N20.1 URETERIC STONE: Primary | ICD-10-CM

## 2023-01-16 DIAGNOSIS — K40.90 NON-RECURRENT UNILATERAL INGUINAL HERNIA WITHOUT OBSTRUCTION OR GANGRENE: ICD-10-CM

## 2023-01-16 DIAGNOSIS — N20.1 URETERIC STONE: ICD-10-CM

## 2023-01-16 DIAGNOSIS — K40.90 NON-RECURRENT UNILATERAL INGUINAL HERNIA WITHOUT OBSTRUCTION OR GANGRENE: Primary | ICD-10-CM

## 2023-01-16 LAB
BILIRUBIN, POC: NORMAL
BLOOD URINE, POC: NORMAL
CLARITY, POC: CLEAR
COLOR, POC: YELLOW
GLUCOSE URINE, POC: NORMAL
KETONES, POC: NORMAL
LEUKOCYTE EST, POC: NORMAL
NITRITE, POC: NORMAL
PH, POC: 5.5
PROSTATE SPECIFIC ANTIGEN: 3.99 NG/ML (ref 0–4)
PROTEIN, POC: NORMAL
SPECIFIC GRAVITY, POC: >=1.03
UROBILINOGEN, POC: 0.2

## 2023-01-16 PROCEDURE — 81003 URINALYSIS AUTO W/O SCOPE: CPT | Performed by: UROLOGY

## 2023-01-16 PROCEDURE — 99203 OFFICE O/P NEW LOW 30 MIN: CPT | Performed by: SURGERY

## 2023-01-16 PROCEDURE — 99204 OFFICE O/P NEW MOD 45 MIN: CPT | Performed by: UROLOGY

## 2023-01-16 ASSESSMENT — ENCOUNTER SYMPTOMS
VOMITING: 0
BACK PAIN: 1
SHORTNESS OF BREATH: 0

## 2023-01-16 NOTE — PROGRESS NOTES
GENERAL SURGERY CLINIC NOTE    Pt Name: Perlita Mathews  MRN: [id number]  Date: 1/16/2023        SUBJECTIVE:     History of Chief Complaint:    Ambrosio Branch is a 59 y.o. male  who presents with left inguinal hernia. He is not complaining of pain or strangulation of the hernia. Past Medical History:   Diagnosis Date    C6 radiculopathy 5/30/2015    GERD (gastroesophageal reflux disease)     LBP (low back pain) 5/30/2015    Osteoarthritis     Rotator cuff impingement syndrome      Past Surgical History:   Procedure Laterality Date    COLONOSCOPY      ELBOW ARTHROPLASTY      FRACTURE SURGERY Right     leg- plate    KNEE SURGERY      multiple dates    [address] [place] [address] HISTORY  5/18/12    I&D OF PERIRECTAL ABSCESS    OTHER SURGICAL HISTORY  8/13/12    hemorrhoid band ligation    NE COLON CA SCRN NOT HI RSK IND N/A 5/15/2017    COLONOSCOPY performed by Wil Tran MD at [address] 1 TENDON SPX Left 8/25/2017    LEFT OPEN ACHILLES REPAIR performed by Morena Reyes MD at [facility]  4 years ago    VASECTOMY  2001     Prior to Admission medications    Medication Sig Start Date End Date Taking?  Authorizing Provider   Probiotic Product (PROBIOTIC-10 PO) Take by mouth   Yes Historical Provider, MD   famotidine (PEPCID) 10 MG tablet Take 10 mg by mouth daily   Yes Historical Provider, MD   Multiple Vitamin (MULTI VITAMIN PO) Take by mouth   Yes Historical Provider, MD     No Known Allergies  Family History   Problem Relation Age of Onset    Other Father         alzheimer's    Stroke Maternal Grandfather      Social History     Tobacco Use    Smoking status: Never    Smokeless tobacco: Former   Substance Use Topics    Alcohol use: Yes     Comment: social    Drug use: No       Review of Systems:  General negative  Denies any fever or chills  HEENT negative  Denies any diplopia, tinnitus or vertigo  Resp negative  Denies any shortness of breath, cough or wheezing  Cardiac negative  Denies any chest pain, palpitations, claudication or edema  GI Normal  Denies any melena, hematochezia, hematemesis or pyrosis   negative  Denies any frequency, urgency, hesitancy or incontinence  Heme negative  Denies bruising or bleeding easily  Endocrine negative, Denies any history of diabetes or thyroid disease  Neuro negative  Denies any focal motor or sensory deficits    OBJECTIVE:   CURRENT VITALS: /86   Pulse 92   Temp 97.7 °F (36.5 °C) (Temporal)   Ht 6' 2\" (1.88 m)   Wt 215 lb (97.5 kg)   BMI 27.60 kg/m²      GEN: Alert and oriented x3, no acute distress, cooperative   SKIN: Skin color, texture, turgor normal. No rashes or lesions  LYMPH: No inguinal nodes  HEENT: Head is normocephalic, atraumatic. EOMI  NECK: Supple, symmetrical, trachea midline, skin normal  PULM: Chest symmetric, clear to auscultation bilaterally without wheezes, rales or rhonchi. No increased work of breathing or accessory muscle use  CV: Heart regular rate and rhythm, no murmurs appreciated  ABD: Soft, nontender, nondistended, no palpable masses  GROIN: reducible nontender left inguinal hernia, no palapble right inguinal defects  NEURO: No focalizing motor or sensory deficits   EXTREMITIES: Warm, dry, no lower extremity edema    LABS:     Recent Labs     01/16/23  1419   COLORU yellow       RADIOLOGY:   I have personally reviewed the following films:    CTA CHEST W WO CONTRAST    Result Date: 1/5/2023  EXAMINATION: CTA OF THE CHEST WITH AND WITHOUT CONTRAST 1/5/2023 12:52 am TECHNIQUE: CTA of the chest was performed before and after the administration of intravenous contrast.  Multiplanar reformatted images are provided for review. MIP images are provided for review. Automated exposure control, iterative reconstruction, and/or weight based adjustment of the mA/kV was utilized to reduce the radiation dose to as low as reasonably achievable. COMPARISON: None. HISTORY: ORDERING SYSTEM PROVIDED HISTORY: AORTA TECHNOLOGIST PROVIDED HISTORY: Reason for exam:->AORTA Decision Support Exception - unselect if not a suspected or confirmed emergency medical condition->Emergency Medical Condition (MA) What reading provider will be dictating this exam?->CRC FINDINGS: Aorta: No evidence of thoracic aortic aneurysm or dissection. Minimal atherosclerotic disease seen within the thoracic aorta. No acute abnormality of the aorta. Mediastinum: No evidence of mediastinal lymphadenopathy. No gross filling defects seen in the pulmonary arteries to suggest pulmonary embolism. Coronary artery calcifications identified. Cardiac chambers are within normal limits. No pericardial effusion. No bulky hilar or axillary lymphadenopathy. The heart and pericardium demonstrate no acute abnormality. Lungs/Pleura: The lungs are without acute process. Minimal dependent atelectatic changes seen at the lung bases. No focal consolidation or pulmonary edema. No evidence of pleural effusion or pneumothorax. Upper Abdomen: Limited images of the upper abdomen are unremarkable. Soft Tissues/Bones: No acute bone or soft tissue abnormality. Minimal degenerative changes seen within the spine with no acute chest wall abnormality. No evidence of thoracic aortic aneurysm, dissection, or intimal flap. No evidence of filling defect to suggest pulmonary embolism. No acute intrathoracic abnormality. CT ABDOMEN PELVIS W IV CONTRAST Additional Contrast? None    Result Date: 1/5/2023  EXAMINATION: CT OF THE ABDOMEN AND PELVIS WITH CONTRAST 1/5/2023 12:52 am TECHNIQUE: CT of the abdomen and pelvis was performed with the administration of intravenous contrast. Multiplanar reformatted images are provided for review. Automated exposure control, iterative reconstruction, and/or weight based adjustment of the mA/kV was utilized to reduce the radiation dose to as low as reasonably achievable. COMPARISON: None. HISTORY: ORDERING SYSTEM PROVIDED HISTORY: rlq TECHNOLOGIST PROVIDED HISTORY: Reason for exam:->rlq Additional Contrast?->None Decision Support Exception - unselect if not a suspected or confirmed emergency medical condition->Emergency Medical Condition (MA) What reading provider will be dictating this exam?->CRC FINDINGS: Lower Chest: The lung bases are grossly clear. Organs: Liver is homogeneous in appearance. No underlying mass or lesion. No stones in the gallbladder. No intrahepatic or extrahepatic biliary ductal dilatation. The pancreas is homogeneous. The spleen is unremarkable. Both adrenal glands are within normal limits. The left kidney is unremarkable. Moderate obstruction of the right kidney and right ureter with perinephric stranding. There is a 2 x 3 mm calcifications seen within the bladder at the right UVJ suggesting a recently passed stone. Mild edema identified of the right UVJ of the bladder. [facility] [place] GI/Bowel: The stomach is mildly distended with fluid. No wall thickening. The small bowel is within normal limits. No mucosal abnormality. Stool seen scattered diffusely throughout the colon. No evidence of obvious obstruction or obstructing lesion. Pelvis: Bladder is decompressed again with a 2 x 3 mm stone near the right UVJ with some wall thickening of the right UVJ suggesting a small amount of edema. The prostate is heterogeneously enlarged. Peritoneum/Retroperitoneum: There is no abdominal or retroperitoneal lymphadenopathy with minimal atherosclerotic disease within the abdominal aorta and iliac vessels. There is no pelvic adenopathy. No abnormal mass or fluid collections identified. Bones/Soft Tissues: Bony structures reveal multilevel degenerative changes identified diffusely throughout the spine. Pelvis is unremarkable. Small umbilical hernia identified with small left inguinal hernia containing fat only.      2 x 3 mm stone identified within the bladder near the right UVJ suggesting a recently passed stone. Mild wall thickening identified at the right UVJ within the bladder with hydronephrosis of the right kidney moderate appearance and dilatation of the right ureter. Perinephric stranding as well seen on the right. XR CHEST PORTABLE    Result Date: 1/5/2023  EXAMINATION: ONE XRAY VIEW OF THE CHEST 1/5/2023 12:23 am COMPARISON: None. HISTORY: ORDERING SYSTEM PROVIDED HISTORY: abd pain TECHNOLOGIST PROVIDED HISTORY: Reason for exam:->abd pain What reading provider will be dictating this exam?->CRC FINDINGS: Frontal portable view of the chest.  Normal lung volume. No focal airspace disease. Normal pulmonary vasculature. No pleural effusion or pneumothorax. Normal cardiomediastinal silhouette and great vessels. Multilevel degenerative disc disease. No acute cardiopulmonary process. ASSESSMENT AND PLAN:   Eleanor Jaime is a 59 y.o. male who presents with left inguinal hernia. He is not complaining of pain or strangulation of the hernia. Patient has a left indirect inguinal hernia  Discussed operation as well as its risks, benefits, and alternatives of surgical repair. Pt expressed understanding. All questions answered. Pt would like to  avoid surgery at this time. Recommendations were made to avoid heavy lifting and straining. He was educated on worrisome signs of hernia obstruction/ strangulation and instructed to seek immediate medical attention if this was to develop.         Keri Rodriguez MD   General surgeon    Electronically signed by Keri Rodriguez MD [facility], on 1/16/2023

## 2023-01-16 NOTE — PROGRESS NOTES
Subjective:      Patient ID: Jonel Apgar is a 59 y.o. male    HPI  This is a 58 yo male with h/o OA/DDD, GERD, and sent by ED after was seen about 2 weeks ago for Rt lower abdominal pain that radiated into the Rt testicle. The pain was a 10/1-10 and he had frequency and urgency 2 days prior to the pain. He had some nausea but no emesis. He had no fever or chills. He had no hematuria or dysuria. He had no prior stones. He was treated in the ED and CT showed a small stone in the bladder. He thinks the stone passed the next morning and has not had any symptoms since that time. He has no prior Gu surgical history. He has no family h/o stones. He hahn snot smoke ciggs. He has a Lt inguinal hernia that occasionally causes him pain with activity. He has not had a PSA in several yrs. He has no other complaints.      Past Medical History:   Diagnosis Date    C6 radiculopathy 2015    GERD (gastroesophageal reflux disease)     LBP (low back pain) 2015    Osteoarthritis     Rotator cuff impingement syndrome      Past Surgical History:   Procedure Laterality Date    COLONOSCOPY      ELBOW ARTHROPLASTY      FRACTURE SURGERY Right     leg- plate    KNEE SURGERY      multiple dates    [address] [place] [address] HISTORY  12    I&D OF PERIRECTAL ABSCESS    OTHER SURGICAL HISTORY  12    hemorrhoid band ligation    VA COLON CA SCRN NOT HI RSK IND N/A 5/15/2017    [facility]cedez [facility] [address] [facility] [address] 1 TENDON SPX Left 2017    LEFT OPEN ACHILLES REPAIR performed by Pearl Agosto MD at [facility] 23  4 years ago    VASECTOMY       Social History     Socioeconomic History    Marital status:      Spouse name: None    Number of children: None    Years of education: None    Highest education level: None   Tobacco Use    Smoking status: Never    Smokeless tobacco: Former   Substance and Sexual Activity Alcohol use: Yes     Comment: social    Drug use: No     Family History   Problem Relation Age of Onset    Other Father         alzheimer's    Stroke Maternal Grandfather      Current Outpatient Medications   Medication Sig Dispense Refill    Probiotic Product (PROBIOTIC-10 PO) Take by mouth      famotidine (PEPCID) 10 MG tablet Take 10 mg by mouth daily      Multiple Vitamin (MULTI VITAMIN PO) Take by mouth       No current facility-administered medications for this visit. Patient has no known allergies. reviewed      Review of Systems   Constitutional:  Negative for chills, fever and unexpected weight change. HENT: Negative. Eyes:  Positive for visual disturbance. Respiratory:  Negative for shortness of breath. Cardiovascular:  Negative for chest pain. Gastrointestinal:  Negative for vomiting. Genitourinary:  Positive for frequency and urgency. Negative for decreased urine volume, dysuria, enuresis, hematuria, penile discharge, penile pain, penile swelling, scrotal swelling and testicular pain. Musculoskeletal:  Positive for back pain. Neurological: Negative. Hematological: Negative. Does not bruise/bleed easily. Psychiatric/Behavioral: Negative. Objective:   Physical Exam  Constitutional:       Appearance: Normal appearance. HENT:      Head: Normocephalic and atraumatic. Eyes:      Conjunctiva/sclera: Conjunctivae normal.   Cardiovascular:      Rate and Rhythm: Normal rate and regular rhythm. Heart sounds: Normal heart sounds. No murmur heard. Pulmonary:      Effort: Pulmonary effort is normal. No respiratory distress. Breath sounds: Normal breath sounds. No stridor. No wheezing, rhonchi or rales. Abdominal:      General: Abdomen is flat. Bowel sounds are normal. There is no distension. Palpations: Abdomen is soft. There is no mass. Tenderness: There is no abdominal tenderness.  There is no right CVA tenderness, left CVA tenderness, guarding or rebound. Hernia: A hernia is present. Hernia is present in the left inguinal area. There is no hernia in the right inguinal area. Genitourinary:     Penis: Normal. No phimosis, paraphimosis, hypospadias, erythema, tenderness, discharge, swelling or lesions. Testes:         Right: Mass, tenderness, swelling, testicular hydrocele or varicocele not present. Right testis is descended. Left: Mass, tenderness, swelling, testicular hydrocele or varicocele not present. Left testis is descended. Epididymis:      Right: Not inflamed or enlarged. No mass or tenderness. Left: Not inflamed or enlarged. No mass or tenderness. Musculoskeletal:      Cervical back: Neck supple. Neurological:      Mental Status: He is alert. Psychiatric:         Mood and Affect: Mood normal.         Behavior: Behavior normal.          Narrative   EXAMINATION:   CT OF THE ABDOMEN AND PELVIS WITH CONTRAST 1/5/2023 12:52 am       TECHNIQUE:   CT of the abdomen and pelvis was performed with the administration of   intravenous contrast. Multiplanar reformatted images are provided for review. Automated exposure control, iterative reconstruction, and/or weight based   adjustment of the mA/kV was utilized to reduce the radiation dose to as low   as reasonably achievable. COMPARISON:   None. HISTORY:   ORDERING SYSTEM PROVIDED HISTORY: rlq   TECHNOLOGIST PROVIDED HISTORY:   Reason for exam:->rlq   Additional Contrast?->None   Decision Support Exception - unselect if not a suspected or confirmed   emergency medical condition->Emergency Medical Condition (MA)   What reading provider will be dictating this exam?->CRC       FINDINGS:   Lower Chest: The lung bases are grossly clear. Organs: Liver is homogeneous in appearance. No underlying mass or lesion. No stones in the gallbladder. No intrahepatic or extrahepatic biliary ductal   dilatation. The pancreas is homogeneous. The spleen is unremarkable. Both   adrenal glands are within normal limits. The left kidney is unremarkable. Moderate obstruction of the right kidney and right ureter with perinephric   stranding. There is a 2 x 3 mm calcifications seen within the bladder at the   right UVJ suggesting a recently passed stone. Mild edema identified of the   right UVJ of the bladder. Edelstein Founds GI/Bowel: The stomach is mildly distended with fluid. No wall thickening. The small bowel is within normal limits. No mucosal abnormality. Stool seen   scattered diffusely throughout the colon. No evidence of obvious obstruction   or obstructing lesion. Pelvis: Bladder is decompressed again with a 2 x 3 mm stone near the right   UVJ with some wall thickening of the right UVJ suggesting a small amount of   edema. The prostate is heterogeneously enlarged. Peritoneum/Retroperitoneum: There is no abdominal or retroperitoneal   lymphadenopathy with minimal atherosclerotic disease within the abdominal   aorta and iliac vessels. There is no pelvic adenopathy. No abnormal mass or   fluid collections identified. Bones/Soft Tissues: Bony structures reveal multilevel degenerative changes   identified diffusely throughout the spine. Pelvis is unremarkable. Small   umbilical hernia identified with small left inguinal hernia containing fat   only. Impression   2 x 3 mm stone identified within the bladder near the right UVJ suggesting a   recently passed stone. Mild wall thickening identified at the right UVJ   within the bladder with hydronephrosis of the right kidney moderate   appearance and dilatation of the right ureter. Perinephric stranding as well   seen on the right.    POCT Urinalysis No Micro (Auto)  Order: [id number]  Status: Final result    Visible to patient: Yes (not seen)    Next appt: None    Dx: Ureteric stone    0 Result Notes  Component 1/16/23 1419    Color, UA yellow    Clarity, UA clear    Glucose, UA POC neg    Bilirubin, UA neg    Ketones, UA neg    Spec Grav, UA >=1.030    Blood, UA POC neg    pH, UA 5.5    Protein, UA POC neg    Urobilinogen, UA 0.2    Leukocytes, UA neg    Nitrite, UA neg               Specimen Collected: 01/16/23 14:19 EST             PSA   Date Value Ref Range Status   04/07/2017 1.60 0.00 - 3.89 ng/mL Final     Comment:     When the Total PSA is between 3.00 and 10.00 ng/mL, consider  requesting a Free PSA to aid in diagnosis. Assessment: This is a 58 yo male with h/o OA/DDD, GERD, and with Rt renal colic and passed a small stone noted on CT done in ED. Since the ED visit he has no further pain or voiding complaints and the U/A shows no hematuria. I reassured him of these findings. Also, discussed dietary stone prevention, increasing urine output over 2.5l per day, normocalcemic diet, no added salt, and decreased animal proteins. Printed info on stone diet was given to the patient today for review. He wants to see Gen surgery for his Lt inguinal hernia.          Plan:      F/U 1 yr for PSA and PSA today and will call for results  Refer to Gen Surgery for Lt inguinal hernia        Antonio Tinoco MD

## 2023-06-05 ENCOUNTER — TELEPHONE (OUTPATIENT)
Dept: UROLOGY | Age: 58
End: 2023-06-05

## 2023-06-05 DIAGNOSIS — N20.1 URETERIC STONE: ICD-10-CM

## 2023-06-05 DIAGNOSIS — N20.1 URETERIC STONE: Primary | ICD-10-CM

## 2023-06-05 LAB — PSA SERPL-MCNC: 2.58 NG/ML (ref 0–4)

## 2023-06-06 ENCOUNTER — OFFICE VISIT (OUTPATIENT)
Dept: UROLOGY | Age: 58
End: 2023-06-06
Payer: COMMERCIAL

## 2023-06-06 VITALS
SYSTOLIC BLOOD PRESSURE: 130 MMHG | BODY MASS INDEX: 27.59 KG/M2 | WEIGHT: 215 LBS | HEART RATE: 92 BPM | HEIGHT: 74 IN | DIASTOLIC BLOOD PRESSURE: 88 MMHG

## 2023-06-06 DIAGNOSIS — N40.0 BPH WITHOUT OBSTRUCTION/LOWER URINARY TRACT SYMPTOMS: Primary | ICD-10-CM

## 2023-06-06 DIAGNOSIS — R97.20 RISING PSA LEVEL: ICD-10-CM

## 2023-06-06 PROCEDURE — 99213 OFFICE O/P EST LOW 20 MIN: CPT | Performed by: UROLOGY

## 2023-06-06 ASSESSMENT — ENCOUNTER SYMPTOMS
ABDOMINAL PAIN: 0
ABDOMINAL DISTENTION: 0

## 2023-06-06 NOTE — PROGRESS NOTES
Subjective:      Patient ID: Vincente Litten is a 59 y.o. male    HPI  This is a 60 yo male with h/o OA/DDD, GERD, and prior h/o Rt stone passage and when last seen had a PSA done that had risen within the normal range and closer PSA follow-up was recommended. Since last seen on 1/16/23, he has no new complaints. He has no colic and no hematuria or dysuria or pain. He has Nf 1-2 and is not that bothersome. I reviewed the interval PSA. Past Medical History:   Diagnosis Date    C6 radiculopathy 5/30/2015    GERD (gastroesophageal reflux disease)     LBP (low back pain) 5/30/2015    Osteoarthritis     Rotator cuff impingement syndrome      Past Surgical History:   Procedure Laterality Date    COLONOSCOPY      ELBOW ARTHROPLASTY      FRACTURE SURGERY Right     leg- plate    KNEE SURGERY      multiple dates    [address] [place] [address] HISTORY  5/18/12    I&D OF PERIRECTAL ABSCESS    OTHER SURGICAL HISTORY  8/13/12    hemorrhoid band ligation    AR COLON CA SCRN NOT HI RSK IND N/A 5/15/2017    COLONOSCOPY performed by Savannah Greer MD at [facility] 1 TENDON SPX Left 8/25/2017    LEFT OPEN ACHILLES REPAIR performed by Damian Koroma MD at [facility] 23  4 years ago    VASECTOMY  2001     Social History     Socioeconomic History    Marital status:    Tobacco Use    Smoking status: Never    Smokeless tobacco: Former   Substance and Sexual Activity    Alcohol use: Yes     Comment: social    Drug use: No     Family History   Problem Relation Age of Onset    Other Father         alzheimer's    Stroke Maternal Grandfather      Current Outpatient Medications   Medication Sig Dispense Refill    Probiotic Product (PROBIOTIC-10 PO) Take by mouth      famotidine (PEPCID) 10 MG tablet Take 10 mg by mouth daily      Multiple Vitamin (MULTI VITAMIN PO) Take by mouth       No current facility-administered medications for this visit.      Patient

## 2023-06-22 ENCOUNTER — OFFICE VISIT (OUTPATIENT)
Dept: FAMILY MEDICINE CLINIC | Age: 58
End: 2023-06-22
Payer: COMMERCIAL

## 2023-06-22 VITALS
BODY MASS INDEX: 28.11 KG/M2 | SYSTOLIC BLOOD PRESSURE: 120 MMHG | OXYGEN SATURATION: 98 % | WEIGHT: 219 LBS | HEART RATE: 86 BPM | DIASTOLIC BLOOD PRESSURE: 76 MMHG | TEMPERATURE: 97.4 F | HEIGHT: 74 IN

## 2023-06-22 DIAGNOSIS — L23.7 CONTACT DERMATITIS DUE TO POISON IVY: Primary | ICD-10-CM

## 2023-06-22 PROCEDURE — 96372 THER/PROPH/DIAG INJ SC/IM: CPT | Performed by: NURSE PRACTITIONER

## 2023-06-22 PROCEDURE — 99213 OFFICE O/P EST LOW 20 MIN: CPT | Performed by: NURSE PRACTITIONER

## 2023-06-22 RX ORDER — TRIAMCINOLONE ACETONIDE 40 MG/ML
80 INJECTION, SUSPENSION INTRA-ARTICULAR; INTRAMUSCULAR ONCE
Status: COMPLETED | OUTPATIENT
Start: 2023-06-22 | End: 2023-06-22

## 2023-06-22 RX ADMIN — TRIAMCINOLONE ACETONIDE 80 MG: 40 INJECTION, SUSPENSION INTRA-ARTICULAR; INTRAMUSCULAR at 10:44

## 2023-06-22 SDOH — ECONOMIC STABILITY: FOOD INSECURITY: WITHIN THE PAST 12 MONTHS, YOU WORRIED THAT YOUR FOOD WOULD RUN OUT BEFORE YOU GOT MONEY TO BUY MORE.: NEVER TRUE

## 2023-06-22 SDOH — ECONOMIC STABILITY: HOUSING INSECURITY
IN THE LAST 12 MONTHS, WAS THERE A TIME WHEN YOU DID NOT HAVE A STEADY PLACE TO SLEEP OR SLEPT IN A SHELTER (INCLUDING NOW)?: NO

## 2023-06-22 SDOH — ECONOMIC STABILITY: FOOD INSECURITY: WITHIN THE PAST 12 MONTHS, THE FOOD YOU BOUGHT JUST DIDN'T LAST AND YOU DIDN'T HAVE MONEY TO GET MORE.: NEVER TRUE

## 2023-06-22 SDOH — ECONOMIC STABILITY: INCOME INSECURITY: HOW HARD IS IT FOR YOU TO PAY FOR THE VERY BASICS LIKE FOOD, HOUSING, MEDICAL CARE, AND HEATING?: NOT HARD AT ALL

## 2023-06-22 ASSESSMENT — ENCOUNTER SYMPTOMS
RHINORRHEA: 0
COUGH: 0
EYE PAIN: 0
CONSTIPATION: 0
COLOR CHANGE: 0
APNEA: 0
EYE ITCHING: 0
ABDOMINAL DISTENTION: 0
SHORTNESS OF BREATH: 0
WHEEZING: 0
DIARRHEA: 0
SORE THROAT: 0
TROUBLE SWALLOWING: 0
VOMITING: 0
ABDOMINAL PAIN: 0
SINUS PAIN: 0
EYE REDNESS: 0
NAUSEA: 0

## 2023-06-22 ASSESSMENT — PATIENT HEALTH QUESTIONNAIRE - PHQ9
SUM OF ALL RESPONSES TO PHQ QUESTIONS 1-9: 0
2. FEELING DOWN, DEPRESSED OR HOPELESS: 0
SUM OF ALL RESPONSES TO PHQ QUESTIONS 1-9: 0
1. LITTLE INTEREST OR PLEASURE IN DOING THINGS: 0
SUM OF ALL RESPONSES TO PHQ9 QUESTIONS 1 & 2: 0

## 2023-06-22 NOTE — PROGRESS NOTES
After obtaining consent, [facility], injection of Kenalog given in Left upper quad. gluteus by Aileen Hart CMA ([facility]). Patient instructed to remain in clinic for 20 minutes afterwards, and to report any adverse reaction to me immediately.
Neurological:  Negative for tremors, seizures, facial asymmetry and headaches. Hematological:  Negative for adenopathy. Psychiatric/Behavioral:  Negative for behavioral problems, confusion, decreased concentration and suicidal ideas. The patient is not hyperactive. All other systems reviewed and are negative. Objective:   /76 (Site: Left Upper Arm)   Pulse 86   Temp 97.4 °F (36.3 °C)   Ht 6' 2\" (1.88 m)   Wt 219 lb (99.3 kg)   SpO2 98%   BMI 28.12 kg/m²     Physical Exam  Vitals and nursing note reviewed. Constitutional:       General: He is awake. He is not in acute distress. Appearance: Normal appearance. He is well-developed, well-groomed and normal weight. He is not ill-appearing, toxic-appearing or diaphoretic. HENT:      Head: Normocephalic and atraumatic. Right Ear: Tympanic membrane normal.      Left Ear: Tympanic membrane normal.      Nose: Nose normal. No rhinorrhea. Mouth/Throat:      Mouth: Mucous membranes are moist.      Pharynx: Oropharynx is clear. No posterior oropharyngeal erythema. Eyes:      Extraocular Movements: Extraocular movements intact. Conjunctiva/sclera: Conjunctivae normal.      Pupils: Pupils are equal, round, and reactive to light. Cardiovascular:      Rate and Rhythm: Normal rate and regular rhythm. Pulses: Normal pulses. Heart sounds: Normal heart sounds. No murmur heard. Pulmonary:      Effort: Pulmonary effort is normal. No tachypnea, bradypnea, accessory muscle usage or respiratory distress. Breath sounds: Normal breath sounds and air entry. No stridor or transmitted upper airway sounds. No decreased breath sounds or wheezing. Comments: Lungs are clear on exam.   Chest:      Chest wall: No tenderness. Abdominal:      General: Abdomen is flat. Bowel sounds are normal.      Palpations: Abdomen is soft. Tenderness: There is no abdominal tenderness. There is no guarding or rebound.    Musculoskeletal:

## 2023-11-15 ENCOUNTER — ANCILLARY PROCEDURE (OUTPATIENT)
Dept: RADIOLOGY | Facility: CLINIC | Age: 58
End: 2023-11-15
Payer: COMMERCIAL

## 2023-11-15 ENCOUNTER — OFFICE VISIT (OUTPATIENT)
Dept: ORTHOPEDIC SURGERY | Facility: CLINIC | Age: 58
End: 2023-11-15
Payer: COMMERCIAL

## 2023-11-15 VITALS — BODY MASS INDEX: 27.59 KG/M2 | HEIGHT: 74 IN | WEIGHT: 215 LBS

## 2023-11-15 DIAGNOSIS — M25.561 ACUTE PAIN OF RIGHT KNEE: ICD-10-CM

## 2023-11-15 DIAGNOSIS — S83.91XA SPRAIN OF KNEE/LEG, RIGHT, INITIAL ENCOUNTER: ICD-10-CM

## 2023-11-15 PROCEDURE — 73562 X-RAY EXAM OF KNEE 3: CPT | Mod: RIGHT SIDE | Performed by: INTERNAL MEDICINE

## 2023-11-15 PROCEDURE — 99214 OFFICE O/P EST MOD 30 MIN: CPT | Performed by: INTERNAL MEDICINE

## 2023-11-15 PROCEDURE — 73562 X-RAY EXAM OF KNEE 3: CPT | Mod: RT

## 2023-11-15 RX ORDER — PREDNISONE 50 MG/1
50 TABLET ORAL DAILY
Qty: 5 TABLET | Refills: 0 | Status: SHIPPED | OUTPATIENT
Start: 2023-11-15 | End: 2023-11-20

## 2023-11-15 NOTE — PROGRESS NOTES
Acute Injury New Patient Visit    CC:   Chief Complaint   Patient presents with    Right Knee - Pain     Rt Knee Pain s/p Basketball, Knee Gave Out 11/14/23 - X-Rays Today       HPI: Ulysses is a 57 y.o. male presents today with acute right knee injury, injured his knee while playing basketball yesterday he landed awkwardly and hyperextended his knee.  He is here for initial evaluation.        Review of Systems   GENERAL: Negative for malaise, significant weight loss, fever  MUSCULOSKELETAL: See HPI  NEURO:  Negative for numbness / tingling     Past Medical History  No past medical history on file.    Medication review  Medication Documentation Review Audit    **Prior to Admission medications have not yet been reviewed**         Allergies  No Known Allergies    Social History  Social History     Socioeconomic History    Marital status:      Spouse name: Not on file    Number of children: Not on file    Years of education: Not on file    Highest education level: Not on file   Occupational History    Not on file   Tobacco Use    Smoking status: Never    Smokeless tobacco: Never   Substance and Sexual Activity    Alcohol use: Not on file    Drug use: Not on file    Sexual activity: Not on file   Other Topics Concern    Not on file   Social History Narrative    Not on file     Social Determinants of Health     Financial Resource Strain: Not on file   Food Insecurity: Not on file   Transportation Needs: Not on file   Physical Activity: Not on file   Stress: Not on file   Social Connections: Not on file   Intimate Partner Violence: Not on file   Housing Stability: Not on file       Surgical History  No past surgical history on file.    Physical Exam:  GENERAL:  Patient is awake, alert, and oriented to person place and time.  Patient appears well nourished and well kept.  Affect Calm, Not Acutely Distressed.  HEENT:  Normocephalic, Atraumatic, EOMI  CARDIOVASCULAR:  Hemodynamically stable.  RESPIRATORY:  Normal  respirations with unlabored breathing.  Extremity: Right knee shows trace to 1+ effusion.  He can flex the right knee to 120 degrees and full extension at 0 degrees, there is pain with full extension.  Some mild pain with valgus stress test with slight instability.  Negative varus test.  Negative Lachman's test.  Negative Thor's test medially and laterally.  Some mild pain over the popliteal fossa.  Most of her pain over the medial and lateral patella facets.  Patellar and quadricep mechanism intact.  Negative anterior and posterior drawer test.      Diagnostics: X-rays reviewed      Procedure: None    Assessment:  1.  Right knee sprain with hyperextension injury  2.  Right knee osteoarthritis    Plan: Ulysses presents today with an acute right knee injury which occurred during basketball and sustained a hyperextension injury and aggravate his knee osteoarthritis.  Recommend a hinged knee brace for support and stability.  Short course of prednisone 50 mg for 5 days, reevaluate in 2 weeks.  If pain worsens and increased instability may consider further imaging such as MRI.  If he is feeling better possible physical therapy.  May consider cortisone injection pending on reevaluation.    Orders Placed This Encounter    Knee Brace, Hinged Short    XR knee right 3 views    predniSONE (Deltasone) 50 mg tablet      At the conclusion of the visit there were no further questions by the patient/family regarding their plan of care.  Patient was instructed to call or return with any issues, questions, or concerns regarding their injury and/or treatment plan described above.     11/15/23 at 3:29 PM - Maria Isabel Cardoso MD    Office: [phone]    This note was prepared using voice recognition software.  The details of this note are correct and have been reviewed, and corrected to the best of my ability.  Some grammatical errors may persist related to the Dragon software.

## 2023-11-30 ENCOUNTER — OFFICE VISIT (OUTPATIENT)
Dept: ORTHOPEDIC SURGERY | Facility: CLINIC | Age: 58
End: 2023-11-30
Payer: COMMERCIAL

## 2023-11-30 DIAGNOSIS — S83.91XA SPRAIN OF KNEE/LEG, RIGHT, INITIAL ENCOUNTER: ICD-10-CM

## 2023-11-30 DIAGNOSIS — M23.91 INTERNAL DERANGEMENT OF RIGHT KNEE: ICD-10-CM

## 2023-11-30 PROCEDURE — 99213 OFFICE O/P EST LOW 20 MIN: CPT | Performed by: INTERNAL MEDICINE

## 2023-11-30 RX ORDER — DIAZEPAM 5 MG/1
5 TABLET ORAL AS NEEDED
Qty: 2 TABLET | Refills: 0 | Status: SHIPPED | OUTPATIENT
Start: 2023-11-30

## 2023-11-30 NOTE — PROGRESS NOTES
CC:   Chief Complaint   Patient presents with    Right Knee - Follow-up     sprain with hyperextension injury  knee osteoarthritis  DOI: 11/14/23  Re evaluate today       HPI: Ulysses is a 58 y.o. male presents today for follow-up for right knee injury which occurred during basketball about 2 weeks ago.  He describes a hyperextension injury of the right knee, since then he has noticed increased bruising and ecchymosis of the hamstring which has gravitated down to his right calf.  He has had more discomfort in the knee today.  Feels more unstable.  He is here for follow-up.        Review of Systems   GENERAL: Negative for malaise, significant weight loss, fever  MUSCULOSKELETAL: See HPI  NEURO:  Negative for numbness / tingling     Past Medical History  No past medical history on file.    Medication review  Medication Documentation Review Audit    **Prior to Admission medications have not yet been reviewed**         Allergies  No Known Allergies    Social History  Social History     Socioeconomic History    Marital status:      Spouse name: Not on file    Number of children: Not on file    Years of education: Not on file    Highest education level: Not on file   Occupational History    Not on file   Tobacco Use    Smoking status: Never    Smokeless tobacco: Never   Substance and Sexual Activity    Alcohol use: Not on file    Drug use: Not on file    Sexual activity: Not on file   Other Topics Concern    Not on file   Social History Narrative    Not on file     Social Determinants of Health     Financial Resource Strain: Not on file   Food Insecurity: Not on file   Transportation Needs: Not on file   Physical Activity: Not on file   Stress: Not on file   Social Connections: Not on file   Intimate Partner Violence: Not on file   Housing Stability: Not on file       Surgical History  No past surgical history on file.    Physical Exam:  GENERAL:  Patient is awake, alert, and oriented to person place and  time.  Patient appears well nourished and well kept.  Affect Calm, Not Acutely Distressed.  HEENT:  Normocephalic, Atraumatic, EOMI  CARDIOVASCULAR:  Hemodynamically stable.  RESPIRATORY:  Normal respirations with unlabored breathing.  Extremity: Right knee shows skin is intact.  Trace to 1+ effusion.  Bruising ecchymosis of the hamstring, with some bruising gravitating down his right leg to his calf.  He can flex her right knee to 130 degrees and full extension at 0 degrees with pain.  Negative valgus and varus stress test.  Positive Thor's test medially and laterally with instability.  Negative Lachman's test.  Negative anterior and posterior drawer test.  Patellar and quadricep mechanism intact.  Pain over the hamstring muscle belly and its insertion.  Slight pain and weakness with flexion of the right knee.  No clinical signs of any compartment syndrome.  He can flex right hip at 90 degrees with no pain with internal or external rotation of the right hip.      Diagnostics: X-rays reviewed  XR knee right 3 views  Interpreted By:  Maria Isabel Ellsworth,   STUDY:  XR KNEE RIGHT 3 VIEWS;  ;  11/15/2023 2:50 pm      INDICATION:  Signs/Symptoms:Pain.      ACCESSION NUMBER(S):  [id number]      ORDERING CLINICIAN:  MARIA ISABEL ELLSWORTH      FINDINGS:  Right knee x-rays three views AP, lateral and sunrise view: No acute  fractures, no dislocation. Tricompartmental degenerative joint  disease, most pronounced at the patellofemoral compartment.          Signed by: Maria Isabel Ellsworth 11/15/2023 4:35 PM  Dictation workstation:   [id number]        Procedure: None    Assessment:  1.  Right knee sprain  2.  Right knee internal derangement  3.  Hamstring strain    Plan: [place] visit for follow-up for right knee injury after a hyperextended injury.  He is still having some pain and swelling and instability.  Continue with his knee brace and home PT program.  He also sustained a hamstring injury, recommend an MRI of the right knee to  evaluate for right knee internal derangement due to persistent pain and instability.  He is requesting follow-up Dr. José Miguel Morris after the MRI he was seen before in the past.    Orders Placed This Encounter    MR knee right wo IV contrast    diazePAM (Valium) 5 mg tablet      At the conclusion of the visit there were no further questions by the patient/family regarding their plan of care.  Patient was instructed to call or return with any issues, questions, or concerns regarding their injury and/or treatment plan described above.     11/30/23 at 4:15 PM - Maria Isabel Cardoso MD    Office: [phone]    This note was prepared using voice recognition software.  The details of this note are correct and have been reviewed, and corrected to the best of my ability.  Some grammatical errors may persist related to the Dragon software.

## 2023-12-08 ENCOUNTER — OFFICE VISIT (OUTPATIENT)
Dept: FAMILY MEDICINE CLINIC | Age: 58
End: 2023-12-08
Payer: COMMERCIAL

## 2023-12-08 VITALS
TEMPERATURE: 97 F | HEART RATE: 84 BPM | SYSTOLIC BLOOD PRESSURE: 128 MMHG | DIASTOLIC BLOOD PRESSURE: 86 MMHG | BODY MASS INDEX: 28.11 KG/M2 | WEIGHT: 219 LBS | OXYGEN SATURATION: 98 % | HEIGHT: 74 IN

## 2023-12-08 DIAGNOSIS — Z00.00 PREVENTATIVE HEALTH CARE: Primary | ICD-10-CM

## 2023-12-08 DIAGNOSIS — Z12.11 SCREEN FOR COLON CANCER: ICD-10-CM

## 2023-12-08 DIAGNOSIS — Z23 NEEDS FLU SHOT: ICD-10-CM

## 2023-12-08 PROCEDURE — 99396 PREV VISIT EST AGE 40-64: CPT | Performed by: FAMILY MEDICINE

## 2023-12-08 PROCEDURE — 90674 CCIIV4 VAC NO PRSV 0.5 ML IM: CPT | Performed by: FAMILY MEDICINE

## 2023-12-08 PROCEDURE — 90471 IMMUNIZATION ADMIN: CPT | Performed by: FAMILY MEDICINE

## 2023-12-08 NOTE — PROGRESS NOTES
After obtaining consent, and per orders of Dr. Verneice Buerger, injection of flu given in Left deltoid by Isa Jean-Baptiste CMA ([facility]). Patient instructed to remain in clinic for 20 minutes afterwards, and to report any adverse reaction to me immediately. Vaccine Information Sheet, \"Influenza - Inactivated\"  given to Gilma Carrier, or parent/legal guardian of  Gilma Mcqueen and verbalized understanding. Patient responses:    Have you ever had a reaction to a flu vaccine? No  Are you able to eat eggs without adverse effects? Yes  Do you have any current illness? No  Have you ever had [place] Syndrome? No    Flu vaccine given per order. Please see immunization tab.
HGB 16.7 01/05/2023    HCT 48.4 01/05/2023     01/05/2023    CHOL 196 04/07/2017    TRIG 284 (H) 04/07/2017    HDL 39 (L) 04/07/2017    ALT 27 01/05/2023    AST 27 01/05/2023     01/05/2023    K 3.9 01/05/2023    CL 99 01/05/2023    CREATININE 1.13 01/05/2023    BUN 19 01/05/2023    CO2 26 01/05/2023    PSA 2.58 06/05/2023         A&P   Diagnosis Orders   1. Preventative health care  Lipid Panel    HIV Screen    Hepatitis C Antibody    Hemoglobin A1C    Comprehensive Metabolic Panel    CBC    TSH      2. Needs flu shot  Influenza, FLUCELVAX, (age 10 mo+), IM, Preservative Free, 0.5 mL      3.  Screen for colon cancer  Fecal DNA Colorectal cancer screening (Cologuard)          See pt instructions    Flu shot    Return for fasting labs       Aden Vasquez MD

## 2023-12-15 DIAGNOSIS — Z00.00 PREVENTATIVE HEALTH CARE: ICD-10-CM

## 2023-12-15 LAB
ALBUMIN SERPL-MCNC: 4.3 G/DL (ref 3.5–4.6)
ALP SERPL-CCNC: 49 U/L (ref 35–104)
ALT SERPL-CCNC: 20 U/L (ref 0–41)
ANION GAP SERPL CALCULATED.3IONS-SCNC: 15 MEQ/L (ref 9–15)
AST SERPL-CCNC: 21 U/L (ref 0–40)
BILIRUB SERPL-MCNC: 0.5 MG/DL (ref 0.2–0.7)
BUN SERPL-MCNC: 18 MG/DL (ref 8–23)
CALCIUM SERPL-MCNC: 9.8 MG/DL (ref 8.5–9.9)
CHLORIDE SERPL-SCNC: 104 MEQ/L (ref 95–107)
CHOLEST SERPL-MCNC: 239 MG/DL (ref 0–199)
CO2 SERPL-SCNC: 22 MEQ/L (ref 20–31)
CREAT SERPL-MCNC: 0.82 MG/DL (ref 0.7–1.2)
ERYTHROCYTE [DISTWIDTH] IN BLOOD BY AUTOMATED COUNT: 11.8 % (ref 11.5–14.5)
GLOBULIN SER CALC-MCNC: 2.8 G/DL (ref 2.3–3.5)
GLUCOSE SERPL-MCNC: 93 MG/DL (ref 70–99)
HBA1C MFR BLD: 5.3 % (ref 4.8–5.9)
HCT VFR BLD AUTO: 47.2 % (ref 42–52)
HDLC SERPL-MCNC: 41 MG/DL (ref 40–59)
HGB BLD-MCNC: 16.3 G/DL (ref 14–18)
LDLC SERPL CALC-MCNC: 150 MG/DL (ref 0–129)
MCH RBC QN AUTO: 32.9 PG (ref 27–31.3)
MCHC RBC AUTO-ENTMCNC: 34.5 % (ref 33–37)
MCV RBC AUTO: 95.4 FL (ref 79–92.2)
PLATELET # BLD AUTO: 253 K/UL (ref 130–400)
POTASSIUM SERPL-SCNC: 4.2 MEQ/L (ref 3.4–4.9)
PROT SERPL-MCNC: 7.1 G/DL (ref 6.3–8)
RBC # BLD AUTO: 4.95 M/UL (ref 4.7–6.1)
SODIUM SERPL-SCNC: 141 MEQ/L (ref 135–144)
TRIGL SERPL-MCNC: 238 MG/DL (ref 0–150)
TSH SERPL-MCNC: 3.59 UIU/ML (ref 0.44–3.86)
WBC # BLD AUTO: 4.9 K/UL (ref 4.8–10.8)

## 2023-12-16 LAB
HEPATITIS C ANTIBODY: NONREACTIVE
HIV AG/AB: NONREACTIVE

## 2023-12-29 ENCOUNTER — HOSPITAL ENCOUNTER (OUTPATIENT)
Dept: RADIOLOGY | Facility: HOSPITAL | Age: 58
Discharge: HOME | End: 2023-12-29
Payer: COMMERCIAL

## 2023-12-29 DIAGNOSIS — S83.91XA SPRAIN OF KNEE/LEG, RIGHT, INITIAL ENCOUNTER: ICD-10-CM

## 2023-12-29 DIAGNOSIS — M23.91 INTERNAL DERANGEMENT OF RIGHT KNEE: ICD-10-CM

## 2023-12-29 PROCEDURE — 73721 MRI JNT OF LWR EXTRE W/O DYE: CPT | Mod: RT

## 2023-12-29 PROCEDURE — 73721 MRI JNT OF LWR EXTRE W/O DYE: CPT | Mod: RIGHT SIDE | Performed by: RADIOLOGY

## 2024-01-02 ENCOUNTER — TELEPHONE (OUTPATIENT)
Dept: ORTHOPEDIC SURGERY | Facility: CLINIC | Age: 59
End: 2024-01-02
Payer: COMMERCIAL

## 2024-01-02 DIAGNOSIS — M17.11 ARTHRITIS OF KNEE, RIGHT: ICD-10-CM

## 2024-01-02 DIAGNOSIS — S82.141A TIBIAL PLATEAU FRACTURE, RIGHT, CLOSED, INITIAL ENCOUNTER: ICD-10-CM

## 2024-01-02 DIAGNOSIS — S83.91XA SPRAIN OF KNEE/LEG, RIGHT, INITIAL ENCOUNTER: Primary | ICD-10-CM

## 2024-01-02 DIAGNOSIS — S76.311A HAMSTRING STRAIN, RIGHT, INITIAL ENCOUNTER: ICD-10-CM

## 2024-01-02 NOTE — TELEPHONE ENCOUNTER
Patient called and left message stating he saw Dr. Cardoso last week and was sent for an MRI and was told to follow up with Dr. José Miguel Morris. He  did look at the results of the MRI which stated he had some fractures and wanted to know if there was anything he should be doing and if he needed a STAT appointment. He can be reached at [phone].

## 2024-01-08 ENCOUNTER — TELEPHONE (OUTPATIENT)
Dept: ORTHOPEDIC SURGERY | Facility: CLINIC | Age: 59
End: 2024-01-08
Payer: COMMERCIAL

## 2024-01-08 NOTE — TELEPHONE ENCOUNTER
Patient called and states that Dr. Cardoso had sent him to physical therapy, unfortunately all  physical therapy locations are NOT in network with patient's insurance. He would like a copy of the PT order printed so he could try to go to a location that is in network with his insurance

## 2024-01-10 LAB — NONINV COLON CA DNA+OCC BLD SCRN STL QL: NEGATIVE

## 2024-01-15 ENCOUNTER — APPOINTMENT (OUTPATIENT)
Dept: ORTHOPEDIC SURGERY | Facility: CLINIC | Age: 59
End: 2024-01-15
Payer: COMMERCIAL

## 2024-01-25 ENCOUNTER — OFFICE VISIT (OUTPATIENT)
Dept: ORTHOPEDIC SURGERY | Facility: CLINIC | Age: 59
End: 2024-01-25
Payer: COMMERCIAL

## 2024-01-25 DIAGNOSIS — S76.311A HAMSTRING STRAIN, RIGHT, INITIAL ENCOUNTER: ICD-10-CM

## 2024-01-25 PROCEDURE — 99214 OFFICE O/P EST MOD 30 MIN: CPT | Performed by: ORTHOPAEDIC SURGERY

## 2024-01-25 PROCEDURE — 1036F TOBACCO NON-USER: CPT | Performed by: ORTHOPAEDIC SURGERY

## 2024-01-25 NOTE — PROGRESS NOTES
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    Extremities: Right knee today examines freely no effusion full extension good flexion and Thor negative Lachman's negative pivot shift no real residual injuries or issues with his right knee    Mid hamstring substance rupture his pretty much healed no palpable defect he has a good straight leg raise but very poor muscle tone he has IT band tendinitis bilaterally but obviously worse on the right side at this time he has pictures of the ecchymotic response that he got after the tear and rupture but that is completely resolved he is ready for more aggressive rehab    Diagnostic studies: No x-rays we reviewed MRI from 12/29/2023    Impression: Right mid substance hamstring tear, right knee contusion with trebucular fracture tibia fibular head he also has 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        Plan: Low impact activity    He needs to begin aggressive quad and hamstring strengthening this could also be benefited with TENS and stem ultrasound IT band dry needling may be beneficial range of motion program to both hip and knee again emphasizing low impact and quad and hamstring strengthening due to the significant amount ultrasound and damage to the knee I am happy that he is having less and less knee pain but we will see him back in 6 weeks AP lateral standing knee x-rays when he returns we will see how he progresses  slowly over the next 6 to 8 weeks follow-up MRI of the hamstring may be needed if he does not improve over 2 to 3 months but again this is nonsurgical and rehab and can take significant amount of time to recover from a mid hamstring substance tear

## 2024-01-30 ENCOUNTER — EVALUATION (OUTPATIENT)
Dept: PHYSICAL THERAPY | Facility: CLINIC | Age: 59
End: 2024-01-30
Payer: COMMERCIAL

## 2024-01-30 DIAGNOSIS — S76.311A HAMSTRING STRAIN, RIGHT, INITIAL ENCOUNTER: ICD-10-CM

## 2024-01-30 DIAGNOSIS — S76.312D HAMSTRING STRAIN, LEFT, SUBSEQUENT ENCOUNTER: Primary | ICD-10-CM

## 2024-01-30 PROCEDURE — 97161 PT EVAL LOW COMPLEX 20 MIN: CPT | Mod: GP

## 2024-01-30 PROCEDURE — 97110 THERAPEUTIC EXERCISES: CPT | Mod: GP

## 2024-01-30 ASSESSMENT — PAIN - FUNCTIONAL ASSESSMENT: PAIN_FUNCTIONAL_ASSESSMENT: 0-10

## 2024-01-30 NOTE — PROGRESS NOTES
Physical Therapy      Physical Therapy Evaluation and Treatment      Patient Name: Ulysses Gallego  MRN: [id number]  Today's Date: 1/30/2024  Time Calculation  Start Time: 1432  Stop Time: 1520  Time Calculation (min): 48 min      Assessment:  Patient presents with R hamstring pain and limited function which is likely d/t a hamstring strain resulting from an acute basketball injury in November. Pt. is likely to benefit from skilled interventions to address their impairments, & treatment to emphasize manual techniques to decrease pain & improve joint/soft-tissue mobility of the L hamstring and lower extremity exercises to increase strength, endurance, balance & neuromotor control.    Standardized testing and measures administered today reveal that the patient has multiple impairments in body structures and functions, activity limitations, and participation restrictions. The patient has 1 personal factors and comorbidities that may serve as barriers affecting the plan of care, including chronicity of symptoms. The patient's clinical presentation includes stable & uncomplicated characteristics as noted during today's evaluation, & these findings indicate that this patient is of low complexity. Skilled PT services are warranted in order to realize measurable change in the above outcome measures and achieve improvements in the patient's functional status and individual goals.      Plan:  Treatment/Interventions: Dry needling, Education/ Instruction, Manual therapy, Neuromuscular re-education, Therapeutic activities, Therapeutic exercises  PT Plan: Skilled PT (Focus on quad and hamstring strenght and endurance, and hamstring lengthening and strengthening, with graudual introduction to running as tolerated)  PT Frequency: 1 time per week  Duration: 8 weeks  Onset Date: 11/14/23    Current Problem:   1. Hamstring strain, left, subsequent encounter  Follow Up In Physical Therapy      2. Hamstring strain, right, initial  encounter  Referral to Physical Therapy          Insurance: [employer]  Visits Approved: BMN  Visit Number: 1  Copay: $40             Subjective    General:  General Comment: R Hamstring Rupture 11/14/23. Pt goes by Aamir Rodriguez. is a 58 year old M , who presents for a physical therapy evaluation for c/o L hamstring pain that has been going on since November. Pt has 3 steps to enter home, and 20 steps bedroom. Pt denies persistent night pain. Pt has any numbness and tingling in the R lower extremity at the Lateral R foot. Pt denies any bowel or bladder changes.    SHARON: playing basketball    Patient has 1/10 pain currently, with 5/10 at worst and 1/10 at best.     Relieving factors: rest    Exacerbating factors: incline walking, running    PLOF: I ADLs very activie pt travels regularly for work    Precautions:  Precautions  Precautions Comment: low impact activity  Pain:  Pain Assessment  Pain Assessment: 0-10  Pain Location: Leg  Pain Orientation: Right, Posterior    Observation:  Pt arrives AMB IND with no antalgic gait. Pt has good hamstring flexibility B/L with slight familiar pain at end range R hamstring extensibility    Palpation:  Pt has mild TTP In the R hamstring    LE Dermatomes +Myotomes  WNL B/L    Hip ROM (degrees)         R      L  Flexion     _WNL_  _WNL_  Extension_WNL_  _WNL_  ABD          _WNL_  _WNL_  IR            _WNL_  _WNL_  ER           _WNL_  _WNL_  *= painful  WNL=Within Normal Limits, Min= min impaired (~0-1/3 of ROM is deficient), Mod= moderately impaired (~1/3-2/3 of ROM is deficient), Max = maximally impaired (>2/3 ROM is deficient)    Hip MMT         R      L  Flexion      _5/5_  _5/5_  Extension _4/5*_  _4+/5_  ABD           _4+/5_  _4+/5_  *= painful    Hip Special Tests:      R      L  Scour      _*_  _*_  YULISSA     _*_  _*_  FADIR      _*_  _*_  Krystian  _*_  _*_  Ely's         _*_  _*_  Quang's     _*_  _*_    Knee ROM (degrees)         R      L  Flexion     _130_  _130_  Extension_0_   _0_  *= painful    Knee MMT         R      L  Flexion     _4/5*_  _5/5_  Extension_4+/5_  _5/5_  *= painful    PT Evaluation Low Complexity (65827):     Therapeutic Exercise (33230):   *Prone eccentric hamstring curls: 2# 15x  *SLR: 15x  *LAQ with active D (seated nerve glide    (*denotes HEP)  Sheet with exercise descriptions and images issued. Skilled intervention utilized in the appropriate selection & application of above exercises. Verbal and tactile cues provided for proper form and technique. Pt. demonstrated appropriate form & verbalized understanding of optimal technique for above exercises.     Pt. Education:  The patient was educated on: the importance of positioning, proper posture, and body mechanics, joint mechanics and pathology, general tissue healing time, the appropriate use of heat and cold to control pain and inflammation, the importance of general therapeutic exercise, especially to stay within pain-free ROM, specific anatomy, function, & regional interdependence of involved areas, & likely cause of impairments & POC. Pt's questions were answered to their satisfaction, & pt. verbalized understanding & agreement with POC.      Goals:    Demonstrate independence with home exercise program  Tolerate increased exercise without adverse reaction  Demonstrate improved posture & proper body mechanics throughout session  Increase R knee EXT/Flex strength to >/= 90% of univolved limb without pain  Report decrease in pain by greater than or equal to 2 points to meet the MCID  Report change in LEFS by greater than or equal to 9 points to meet the MCID (IE 54/80)  Perform ADLs without an increase symptoms  Perform ambulation activities without increase in symptoms  Achieve pt. specific goals including: 'return to jogging without pain'    Outcome Measures:    LEFS: 54/80

## 2024-02-07 NOTE — PROGRESS NOTES
Physical Therapy Treatment    Patient Name: Ulysses Gallego  MRN: [id number]  Today's Date: 2/9/2024       Current Problem  S76.312D L Ham strain    Insurance     Precautions         Subjective   Pt notes stiffness more than pain.    Pain     0/10    Objective     Treatments:  There Ex:   ARIN 5'  Wall Squat 15x  Standing 3-Way Hip 15x    Manual: 15'  R ham string DTM  Assessment:   Pt arrives stating that he feeling well overall, but has more stiffness than pain.  He has been walking at home about 1 mile a few times now and the lst time he felt more stiffness afterwards.      Plan:     Cont w/ exs and POC    Goals:   Demonstrate independence with home exercise program  Tolerate increased exercise without adverse reaction  Demonstrate improved posture & proper body mechanics throughout session  Increase R knee EXT/Flex strength to >/= 90% of univolved limb without pain  Report decrease in pain by greater than or equal to 2 points to meet the MCID  Report change in LEFS by greater than or equal to 9 points to meet the MCID (IE 54/80)  Perform ADLs without an increase symptoms  Perform ambulation activities without increase in symptoms  Achieve pt. specific goals including: 'return to jogging without pain'

## 2024-02-09 ENCOUNTER — TREATMENT (OUTPATIENT)
Dept: PHYSICAL THERAPY | Facility: CLINIC | Age: 59
End: 2024-02-09
Payer: COMMERCIAL

## 2024-02-09 DIAGNOSIS — S76.312D HAMSTRING STRAIN, LEFT, SUBSEQUENT ENCOUNTER: ICD-10-CM

## 2024-02-09 PROBLEM — S76.311A STRAIN OF RIGHT HAMSTRING: Status: ACTIVE | Noted: 2024-02-09

## 2024-02-09 PROCEDURE — 97110 THERAPEUTIC EXERCISES: CPT | Mod: GP

## 2024-02-09 PROCEDURE — 97140 MANUAL THERAPY 1/> REGIONS: CPT | Mod: GP

## 2024-02-16 ENCOUNTER — TREATMENT (OUTPATIENT)
Dept: PHYSICAL THERAPY | Facility: CLINIC | Age: 59
End: 2024-02-16
Payer: COMMERCIAL

## 2024-02-16 DIAGNOSIS — S76.312D HAMSTRING STRAIN, LEFT, SUBSEQUENT ENCOUNTER: ICD-10-CM

## 2024-02-16 PROCEDURE — 97140 MANUAL THERAPY 1/> REGIONS: CPT | Mod: GP | Performed by: PHYSICAL THERAPIST

## 2024-02-16 PROCEDURE — 97110 THERAPEUTIC EXERCISES: CPT | Mod: GP | Performed by: PHYSICAL THERAPIST

## 2024-02-16 NOTE — PROGRESS NOTES
"Physical Therapy    Physical Therapy Treatment    Patient Name: Ulysses Gallego  MRN: [id number]  Today's Date: 2/16/2024    [facility]  Allowed visits: BMN    Subjective  Patient with no new complaints today. He expresses some frustration with lack of progress stating \"we need to focus on the hamstring\". He has been compliant with his HEP. He continues to have difficulty even with light jogging \"like trotting to try to catch an elevator\". He denies pain currently and did well following last workout.     Objective  Reviewed and progressed marked therapeutic exercise per patient tolerance (61685 - 45 minutes, 3 units) [place] 5'  *SL bridge 2x10  *Triple threat 2x10  *Prone glute max 2.5 lbs 2x10  Prone HS curls with eccentric 2.5 lbs 2x10  Leg press DL/ lbs/80 lbs 2x10 ea  HS curls DL/SL 40 lbs/20 lbs 2x10 ea  *Stool scoots 1 lap  *SL RDLs 5 lbs 2x10    Assessment  Progressed workout focusing on gluteals and HS today with good tolerance overall thought fatigue evident especially with stool scoots. Encouraged continued compliance with HEP.     Plan  Continue per POC at this time.    "

## 2024-02-23 ENCOUNTER — TREATMENT (OUTPATIENT)
Dept: PHYSICAL THERAPY | Facility: CLINIC | Age: 59
End: 2024-02-23
Payer: COMMERCIAL

## 2024-02-23 DIAGNOSIS — S76.312D HAMSTRING STRAIN, LEFT, SUBSEQUENT ENCOUNTER: ICD-10-CM

## 2024-02-23 PROCEDURE — 97110 THERAPEUTIC EXERCISES: CPT | Mod: GP | Performed by: PHYSICAL THERAPIST

## 2024-02-23 NOTE — PROGRESS NOTES
Physical Therapy    Physical Therapy Treatment    Patient Name: Ulysses Gallego  MRN: [id number]  Today's Date: 2/23/2024    [facility]  Allowed visits: BMN    Subjective  Patient with increased soreness following last workout but this was transient; did not last for more than 24 hours. He has been compliant with his HEP. He would like exercises he can do while he is sitting at his desk.     Objective  Reviewed and progressed marked therapeutic exercise per patient tolerance (97664 - 40 minutes, 3 units) TM walking with incline and speed at patient's discretion 5'   Dynamic HS stretch/tin soldiers 1 lap  Triple Threat 2x10  SL bridge 2x10  *SL sit to stand x10   Seated heel slide with paper plate and 2.5 lb weight plate x20  Leg press DL/ lbs/80 lbs 2x15  HS curls DL/SL 40 lbs/20 lbs 2x15   Stool scoots 1 lap  SL RDLs 5 lbs 2x10    *added to HEP    Assessment  Patient tolerated workout well though fatigued following session. He does note increased anterior leg discomfort in ipsilateral LE with walking on an incline. This is likely due to proximal weakness. Encouraged continued compliance with his HEP.     Plan  Continue per POC at this time.

## 2024-03-01 ENCOUNTER — TREATMENT (OUTPATIENT)
Dept: PHYSICAL THERAPY | Facility: CLINIC | Age: 59
End: 2024-03-01
Payer: COMMERCIAL

## 2024-03-01 DIAGNOSIS — S76.312D HAMSTRING STRAIN, LEFT, SUBSEQUENT ENCOUNTER: ICD-10-CM

## 2024-03-01 PROCEDURE — 97110 THERAPEUTIC EXERCISES: CPT | Mod: GP

## 2024-03-01 ASSESSMENT — PAIN SCALES - GENERAL: PAINLEVEL_OUTOF10: 0 - NO PAIN

## 2024-03-01 ASSESSMENT — PAIN - FUNCTIONAL ASSESSMENT: PAIN_FUNCTIONAL_ASSESSMENT: 0-10

## 2024-03-01 NOTE — PROGRESS NOTES
"Physical Therapy Treatment    Patient Name: Ulysses Gallego  MRN: [id number]  Today's Date: 3/1/2024  Time Calculation  Start Time: 1103  Stop Time: 1147  Time Calculation (min): 44 min      Assessment:  Pt tolerated today's session well with no increase in familiar symptoms. Pt was able to progress program with the addition of supine cable column hamstring exercises and power walking on a TM with no adverse reaction other than expected fatigue. Pt will continue to benefit from skilled physical therapy interventions to improve R lower extremity ROM, strength, endurance, and balance.      Plan:  OP PT Plan  PT Plan: Skilled PT (Focus on quad and hamstring strenght and endurance, and hamstring lengthening and strengthening, with graudual introduction to running as tolerated)    Current Problem  1. Hamstring strain, left, subsequent encounter  Follow Up In Physical Therapy          Insurance: [facility]  Visits Approved: [facility]  Visit Number: 5            Subjective   General  General Comment: R Hamstring Rupture 11/14/23. Pt goes by Aamir Gallego denies any falls since their last visit.  Pt reports full compliance with home exercise program.  Pt reports that he tried jogging the other day and had some soreness for a few days but otherwise only has complaints of \"rubbery leg\"    Precautions  Precautions  Precautions Comment: low impact activity    Pain  Pain Assessment: 0-10  Pain Score: 0 - No pain  Pain Location: Leg  Pain Orientation: Right, Posterior    Objective   Pt arrives AMB IND with no antalgic gait.    Treatments: PT Therapeutic Procedures Time Entry  Therapeutic Exercise Time Entry: 44    Therapeutic Exercise (84171):   TM walking with up to 4 MPH with 1 minute warm up and cool down 10'  Dynamic HS stretch/tin soldiers 1 lap  Triple Threat 2x10 NT  SL bridge 2x10  *SL sit to stand x10   Seated heel slide with paper plate and 2.5 lb weight plate x20  Leg press DL/ lbs/80 lbs 2x15  HS curls DL/SL 40 " lbs/20 lbs 2x15   Stool scoots 3 laps (1 neutral, 1 biceps femoris bias, 1 semitendinous/semimembranosus bias)   SL RDLs 5 lbs 2x10 review  Supine Cable column HSC 7.5 # x15, 15# x15  Supine cable column straight leg hip ext: 7.5# x15, 10# x10    (*denotes HEP)  Sheet with exercise descriptions and images issued. Skilled intervention utilized in the appropriate selection & application of above exercises. Verbal and tactile cues provided for proper form and technique. Pt. demonstrated appropriate form & verbalized understanding of optimal technique for above exercises.     Pt. Education:  The patient was educated on: the importance of positioning, proper posture, and body mechanics, joint mechanics and pathology, general tissue healing time, the appropriate use of heat and cold to control pain and inflammation, the importance of general therapeutic exercise, especially to stay within pain-free ROM, specific anatomy, function, & regional interdependence of involved areas, & likely cause of impairments & POC. Pt's questions were answered to their satisfaction, & pt. verbalized understanding & agreement with POC.

## 2024-03-07 ENCOUNTER — TREATMENT (OUTPATIENT)
Dept: PHYSICAL THERAPY | Facility: CLINIC | Age: 59
End: 2024-03-07
Payer: COMMERCIAL

## 2024-03-07 ENCOUNTER — HOSPITAL ENCOUNTER (OUTPATIENT)
Dept: RADIOLOGY | Facility: CLINIC | Age: 59
Discharge: HOME | End: 2024-03-07
Payer: COMMERCIAL

## 2024-03-07 ENCOUNTER — OFFICE VISIT (OUTPATIENT)
Dept: ORTHOPEDIC SURGERY | Facility: CLINIC | Age: 59
End: 2024-03-07
Payer: COMMERCIAL

## 2024-03-07 DIAGNOSIS — S76.311A HAMSTRING STRAIN, RIGHT, INITIAL ENCOUNTER: Primary | ICD-10-CM

## 2024-03-07 DIAGNOSIS — M25.561 ACUTE PAIN OF RIGHT KNEE: ICD-10-CM

## 2024-03-07 DIAGNOSIS — S76.312D HAMSTRING STRAIN, LEFT, SUBSEQUENT ENCOUNTER: ICD-10-CM

## 2024-03-07 PROCEDURE — 1036F TOBACCO NON-USER: CPT | Performed by: ORTHOPAEDIC SURGERY

## 2024-03-07 PROCEDURE — 97110 THERAPEUTIC EXERCISES: CPT | Mod: GP

## 2024-03-07 PROCEDURE — 99213 OFFICE O/P EST LOW 20 MIN: CPT | Performed by: ORTHOPAEDIC SURGERY

## 2024-03-07 PROCEDURE — 73560 X-RAY EXAM OF KNEE 1 OR 2: CPT | Mod: RIGHT SIDE | Performed by: ORTHOPAEDIC SURGERY

## 2024-03-07 PROCEDURE — 73560 X-RAY EXAM OF KNEE 1 OR 2: CPT | Mod: RT

## 2024-03-07 ASSESSMENT — PAIN - FUNCTIONAL ASSESSMENT: PAIN_FUNCTIONAL_ASSESSMENT: 0-10

## 2024-03-07 ASSESSMENT — PAIN SCALES - GENERAL: PAINLEVEL_OUTOF10: 0 - NO PAIN

## 2024-03-07 NOTE — PROGRESS NOTES
History of present illness: Patient with right hamstring knee injury back in December    He had a mid substance hamstring tear he had some known calcium chondral gnosis some cartilage defect and irritation of the knee prior calcium pyrophosphate disease he had a small fibular head avulsion as well    Physical exam:    General: No acute distress or breathing difficulty or discomfort, pleasant and cooperative with the examination.    Extremities: Presently the knee is doing well full extension good flexion negative Blankenship negative Lachman's no swelling no redness no warmth he has full extension he can flex to 170 the knee stable    His hamstring is still little bit soft he has no palpable defect mid substance ecchymosis bruising swelling edema is all gone his biggest issue is just residual weakness and strength gait        Diagnostic studies: Right knee x-ray shows no fracture no bony abnormality known calcium chondral gnosis patellofemoral arthrosis is the most obvious apparent feature with a calcium deposition disease right knee    Impression: Presently the knee is asymptomatic    Hamstring is functioning well but still weak    Plan: Continue low impact activities to strengthen the hamstring but be aware that his knee is moderately to significantly arthritic    He has no residual knee restrictions but should do low impact    He is biking strengthening water program I will see him back on his own slow return to full activities will probably see him in the future for evaluation and treatment of his osteoarthritic changes in the right knee

## 2024-03-07 NOTE — PROGRESS NOTES
Physical Therapy Treatment    Patient Name: Ulysses Gallego  MRN: [id number]  Today's Date: 3/7/2024  Time Calculation  Start Time: 0949  Stop Time: 1035  Time Calculation (min): 46 min      Assessment:  Pt tolerated today's session well with no increase in familiar symptoms. Pt was able to progress program with the addition of low impact dynamic stretchs and SLS balance exercises with no adverse reaction other than expected fatigue.Pt will continue to benefit from skilled physical therapy interventions to improve lower extremity ROM, strength, endurance, and balance.       Plan:  OP PT Plan  PT Plan: Skilled PT (Focus on quad and hamstring strenght and endurance, and hamstring lengthening and strengthening, with graudual introduction to running as tolerated)    Current Problem  1. Hamstring strain, left, subsequent encounter  Follow Up In Physical Therapy          Insurance: [facility]  Visits Approved: [facility]  Visit Number: 6            Subjective   General  General Comment: R Hamstring Rupture 11/14/23. Pt goes by Aamir Gallego denies any falls since their last visit.  Pt reports full compliance with home exercise program.  Pt reports that he has been power walking on TM the last few days and had some mid posterior thigh discomfort.       Precautions  Precautions  Precautions Comment: low impact activity    Pain  Pain Assessment: 0-10  Pain Score: 0 - No pain  Pain Location: Leg  Pain Orientation: Right, Posterior    Objective   Pt arrives AMB IND with no antalgic gait. Weakness in R hamstring compared to L.    Treatments: PT Therapeutic Procedures Time Entry  Therapeutic Exercise Time Entry: 46Dictation #1  MRN:[id number]  CSN:[id number]     Therapeutic Exercise (01923):   TM walking with up to 4 MPH with 1 minute warm up and cool down 10'  Dynamic stretching low impact HS stretch/tin soldiers, high knees, butt kicks, lateral atepping 1 lap  Triple Threat 2x10 NT  SL bridge 2x10 NT  *SL sit to stand x10   Seated  "heel slide with paper plate and 2.5 lb weight plate x20 NT  Leg press DL/ lbs/80 lbs 2x15  HS curls DL/SL 40 lbs/20 lbs 2x15   Stool scoots 3 laps (1 neutral, 1 biceps femoris bias, 1 semitendinous/semimembranosus bias)   SL RDLs 5 lbs 2x10   Supine Cable column HSC 7.5 # x15, 15# x15 NT  Supine cable column straight leg hip ext: 7.5# x15, 10# x10 NT  SLS 3x/30\" B/L 2 on foam  SLS hip ABD/EXT: 10x Ea B/L    (*denotes HEP)  Sheet with exercise descriptions and images issued. Skilled intervention utilized in the appropriate selection & application of above exercises. Verbal and tactile cues provided for proper form and technique. Pt. demonstrated appropriate form & verbalized understanding of optimal technique for above exercises.     Pt. Education:  The patient was educated on: the importance of positioning, proper posture, and body mechanics, joint mechanics and pathology, general tissue healing time, the appropriate use of heat and cold to control pain and inflammation, the importance of general therapeutic exercise, especially to stay within pain-free ROM, specific anatomy, function, & regional interdependence of involved areas, & likely cause of impairments & POC. Pt's questions were answered to their satisfaction, & pt. verbalized understanding & agreement with POC.     "

## 2024-03-15 ENCOUNTER — TREATMENT (OUTPATIENT)
Dept: PHYSICAL THERAPY | Facility: CLINIC | Age: 59
End: 2024-03-15
Payer: COMMERCIAL

## 2024-03-15 DIAGNOSIS — S76.311D RUPTURE OF RIGHT PROXIMAL HAMSTRING TENDON, SUBSEQUENT ENCOUNTER: ICD-10-CM

## 2024-03-15 PROCEDURE — 97110 THERAPEUTIC EXERCISES: CPT | Mod: GP | Performed by: PHYSICAL THERAPIST

## 2024-03-15 ASSESSMENT — PAIN SCALES - GENERAL: PAINLEVEL_OUTOF10: 0 - NO PAIN

## 2024-03-15 NOTE — PROGRESS NOTES
"Physical Therapy    Physical Therapy Treatment    Patient Name: Ulysses Gallego  MRN: [id number]  Today's Date: 3/15/2024    [facility]  Allowed visits: BMN    Subjective  Patient with some tightness in his hamstring today. He has been compliant with his HEP. He did F/U with Dr. Morris last week and was told to avoid running until he is 6 months out from injury. He denies pain currently.     Objective  Reviewed and progressed marked therapeutic exercise per patient tolerance (00651 - 40 minutes, 3 units) [place] 5'    Dynamic HS stretch/tin soldiers 1 lap   High knees 1 lap   Butt kicks 1 lap   Triple Threat 2x15  SL bridge off plinth with 25 lb weight plate 2x10  *[place] HS with physioball 2x10  Leg press DL/ lbs/90 lbs 2x10  Dead lifts with CC 20 lbs 2x10  HS curls DL/SL 50 lbs/20 lbs;30 lbs 2x10/10;10  Stool scoots 1 lap  *Seated HS stretch 30\"x3    *added to HEP    Assessment  Patient tolerated workout well though challenged with upgrades. Able to tolerated increased weight with HS curls today. Encouraged continued compliance with HEP.      Plan  Continue per POC at this time.    "

## 2024-03-29 ENCOUNTER — APPOINTMENT (OUTPATIENT)
Dept: PHYSICAL THERAPY | Facility: CLINIC | Age: 59
End: 2024-03-29
Payer: COMMERCIAL

## 2024-04-19 ENCOUNTER — OFFICE VISIT (OUTPATIENT)
Dept: FAMILY MEDICINE CLINIC | Age: 59
End: 2024-04-19
Payer: COMMERCIAL

## 2024-04-19 VITALS
OXYGEN SATURATION: 97 % | WEIGHT: 225.6 LBS | TEMPERATURE: 97.9 F | DIASTOLIC BLOOD PRESSURE: 80 MMHG | BODY MASS INDEX: 28.95 KG/M2 | SYSTOLIC BLOOD PRESSURE: 148 MMHG | HEART RATE: 87 BPM | HEIGHT: 74 IN

## 2024-04-19 DIAGNOSIS — R51.9 NONINTRACTABLE EPISODIC HEADACHE, UNSPECIFIED HEADACHE TYPE: ICD-10-CM

## 2024-04-19 DIAGNOSIS — F41.9 ANXIETY: ICD-10-CM

## 2024-04-19 DIAGNOSIS — R42 VERTIGO: Primary | ICD-10-CM

## 2024-04-19 PROCEDURE — 99214 OFFICE O/P EST MOD 30 MIN: CPT | Performed by: FAMILY MEDICINE

## 2024-04-19 RX ORDER — DIAZEPAM 10 MG/1
10 TABLET ORAL ONCE
Qty: 1 TABLET | Refills: 0 | Status: SHIPPED | OUTPATIENT
Start: 2024-04-19 | End: 2024-04-19

## 2024-04-19 RX ORDER — MECLIZINE HYDROCHLORIDE 25 MG/1
25 TABLET ORAL 3 TIMES DAILY PRN
Qty: 30 TABLET | Refills: 0 | Status: SHIPPED | OUTPATIENT
Start: 2024-04-19 | End: 2024-04-29

## 2024-04-19 ASSESSMENT — PATIENT HEALTH QUESTIONNAIRE - PHQ9
1. LITTLE INTEREST OR PLEASURE IN DOING THINGS: NOT AT ALL
2. FEELING DOWN, DEPRESSED OR HOPELESS: NOT AT ALL
SUM OF ALL RESPONSES TO PHQ QUESTIONS 1-9: 0
SUM OF ALL RESPONSES TO PHQ9 QUESTIONS 1 & 2: 0
SUM OF ALL RESPONSES TO PHQ QUESTIONS 1-9: 0

## 2024-04-19 NOTE — PROGRESS NOTES
Chief Complaint   Patient presents with    Loss of Consciousness     [facility], blood work done, had been going on for a couple days, almost passed out every morning   Got dizzy driving and went to ER, spinning, x 1 month headaches in  back of head, lightheaded since those started        HPI:  Scotty Arthur is a 58 y.o. male     Review ER notes  Lightheaded/room spinning  Was bad enough out of town had to stop driving, went to ER    Headaches, pain in back of head     Describes vertigo symptoms     Symptoms have continued, but not as severe       Patient Active Problem List   Diagnosis    Rafaela-rectal abscess    Internal hemorrhoid, bleeding    Fissure in ano    Shoulder pain, right    Arm weakness    Knee pain-left    Low back pain    C6 radiculopathy    Ulnar neuropathy    DJD (degenerative joint disease), cervical    Deformity, elbow acquired    Perirectal abscess       Current Outpatient Medications   Medication Sig Dispense Refill    meclizine (ANTIVERT) 25 MG tablet Take 1 tablet by mouth 3 times daily as needed for Dizziness 30 tablet 0    diazePAM (VALIUM) 10 MG tablet Take 1 tablet by mouth once for 1 dose. Max Daily Amount: 10 mg 1 tablet 0    Probiotic Product (PROBIOTIC-10 PO) Take by mouth      famotidine (PEPCID) 10 MG tablet Take 1 tablet by mouth daily      Multiple Vitamin (MULTI VITAMIN PO) Take by mouth       No current facility-administered medications for this visit.         Past Medical History:   Diagnosis Date    C6 radiculopathy 5/30/2015    GERD (gastroesophageal reflux disease)     LBP (low back pain) 5/30/2015    Osteoarthritis     Rotator cuff impingement syndrome      Past Surgical History:   Procedure Laterality Date    COLONOSCOPY      ELBOW ARTHROPLASTY      FRACTURE SURGERY Right     leg- plate    KNEE SURGERY      multiple dates    LEG SURGERY  1993    OTHER SURGICAL HISTORY  5/18/12    I&D OF PERIRECTAL ABSCESS    OTHER SURGICAL HISTORY  8/13/12    hemorrhoid band

## 2024-05-02 ENCOUNTER — HOSPITAL ENCOUNTER (OUTPATIENT)
Dept: MRI IMAGING | Age: 59
Discharge: HOME | End: 2024-05-04
Payer: COMMERCIAL

## 2024-05-02 DIAGNOSIS — R42 VERTIGO: ICD-10-CM

## 2024-05-02 DIAGNOSIS — R51.9 NONINTRACTABLE EPISODIC HEADACHE, UNSPECIFIED HEADACHE TYPE: ICD-10-CM

## 2024-05-02 PROCEDURE — 6360000004 HC RX CONTRAST MEDICATION: Performed by: FAMILY MEDICINE

## 2024-05-02 PROCEDURE — A9579 GAD-BASE MR CONTRAST NOS,1ML: HCPCS | Performed by: FAMILY MEDICINE

## 2024-05-02 PROCEDURE — 70553 MRI BRAIN STEM W/O & W/DYE: CPT

## 2024-05-02 RX ADMIN — GADOTERIDOL 20 ML: 279.3 INJECTION, SOLUTION INTRAVENOUS at 09:23

## 2024-06-17 DIAGNOSIS — R97.20 RISING PSA LEVEL: Primary | ICD-10-CM

## 2024-06-17 DIAGNOSIS — R97.20 RISING PSA LEVEL: ICD-10-CM

## 2024-06-17 LAB — PSA SERPL-MCNC: 3.16 NG/ML (ref 0–4)

## 2024-06-18 ENCOUNTER — OFFICE VISIT (OUTPATIENT)
Dept: UROLOGY | Age: 59
End: 2024-06-18
Payer: COMMERCIAL

## 2024-06-18 VITALS
SYSTOLIC BLOOD PRESSURE: 122 MMHG | WEIGHT: 215 LBS | HEART RATE: 87 BPM | BODY MASS INDEX: 27.6 KG/M2 | DIASTOLIC BLOOD PRESSURE: 70 MMHG | OXYGEN SATURATION: 97 %

## 2024-06-18 DIAGNOSIS — N40.0 BPH WITHOUT OBSTRUCTION/LOWER URINARY TRACT SYMPTOMS: Primary | ICD-10-CM

## 2024-06-18 PROCEDURE — 99213 OFFICE O/P EST LOW 20 MIN: CPT | Performed by: UROLOGY

## 2024-06-18 ASSESSMENT — ENCOUNTER SYMPTOMS
ABDOMINAL PAIN: 0
ABDOMINAL DISTENTION: 0

## 2024-06-18 NOTE — PROGRESS NOTES
Subjective:      Patient ID: Scotty Arthur is a 58 y.o. male    HPI  This is a 57 yo male with h/o OA/DDD, GERD, and kidney stones and back for yearly PSA follow-up. Since last seen, he had a lower ext fracture and tendon rupture neither required surgery. He has no new voiding complaints. He has no hematuria or dysuria or pain. Since last seen on 6/6/23, he has no other new complaints. I reviewed the interval PSA     Past Medical History:   Diagnosis Date    C6 radiculopathy 5/30/2015    GERD (gastroesophageal reflux disease)     LBP (low back pain) 5/30/2015    Osteoarthritis     Rotator cuff impingement syndrome      Past Surgical History:   Procedure Laterality Date    COLONOSCOPY      ELBOW ARTHROPLASTY      FRACTURE SURGERY Right     leg- plate    KNEE SURGERY      multiple dates    LEG SURGERY  1993    OTHER SURGICAL HISTORY  5/18/12    I&D OF PERIRECTAL ABSCESS    OTHER SURGICAL HISTORY  8/13/12    hemorrhoid band ligation    MI COLON CA SCRN NOT HI RSK IND N/A 5/15/2017    COLONOSCOPY performed by Rashad Coffey MD at [facility]    MI LNGTH/SHRT TENDON LEG/ANKLE 1 TENDON SPX Left 8/25/2017    LEFT OPEN ACHILLES REPAIR [place]odi [place] [facility]    ROTATOR CUFF REPAIR  4 years ago    VASECTOMY  2001     Social History     Socioeconomic History    Marital status:      Spouse name: None    Number of children: None    Years of education: None    Highest education level: None   Tobacco Use    Smoking status: Never    Smokeless tobacco: Former   Substance and Sexual Activity    Alcohol use: Yes     Comment: social    Drug use: No     Social Determinants of Health     Financial Resource Strain: Low Risk  (6/22/2023)    Overall Financial Resource Strain (CARDIA)     Difficulty of Paying Living Expenses: Not hard at all   Transportation Needs: Unknown (6/22/2023)    PRAPARE - Transportation     Lack of Transportation (Non-Medical): No   Housing Stability: Unknown (6/22/2023)

## 2025-06-16 DIAGNOSIS — N40.0 BPH WITHOUT OBSTRUCTION/LOWER URINARY TRACT SYMPTOMS: ICD-10-CM

## 2025-06-16 LAB — PSA SERPL-MCNC: 3.42 NG/ML (ref 0–4)

## 2025-06-19 ENCOUNTER — OFFICE VISIT (OUTPATIENT)
Age: 60
End: 2025-06-19
Payer: COMMERCIAL

## 2025-06-19 VITALS
DIASTOLIC BLOOD PRESSURE: 70 MMHG | HEART RATE: 73 BPM | BODY MASS INDEX: 26.95 KG/M2 | WEIGHT: 210 LBS | HEIGHT: 74 IN | SYSTOLIC BLOOD PRESSURE: 112 MMHG

## 2025-06-19 DIAGNOSIS — K40.90 INGUINAL HERNIA OF LEFT SIDE WITHOUT OBSTRUCTION OR GANGRENE: Primary | ICD-10-CM

## 2025-06-19 DIAGNOSIS — N40.0 BPH WITHOUT OBSTRUCTION/LOWER URINARY TRACT SYMPTOMS: Primary | ICD-10-CM

## 2025-06-19 PROCEDURE — 99213 OFFICE O/P EST LOW 20 MIN: CPT | Performed by: UROLOGY

## 2025-06-19 RX ORDER — IBUPROFEN 100 MG/5ML
SUSPENSION ORAL EVERY 4 HOURS PRN
COMMUNITY

## 2025-06-19 ASSESSMENT — ENCOUNTER SYMPTOMS: ABDOMINAL PAIN: 0

## 2025-06-19 NOTE — PROGRESS NOTES
Stability Vital Sign     Unstable Housing in the Last Year: No     Family History   Problem Relation Age of Onset    Other Father         alzheimer's    Stroke Maternal Grandfather      Current Outpatient Medications   Medication Sig Dispense Refill    Probiotic Product (PROBIOTIC-10 PO) Take by mouth      famotidine (PEPCID) 10 MG tablet Take 1 tablet by mouth daily      Multiple Vitamin (MULTI VITAMIN PO) Take by mouth       No current facility-administered medications for this visit.     Patient has no known allergies.  reviewed      Review of Systems   Constitutional:  Negative for unexpected weight change.   Gastrointestinal:  Negative for abdominal pain.   Genitourinary:  Negative for difficulty urinating, dysuria, frequency, hematuria, penile pain, scrotal swelling and testicular pain.         Objective:   Physical Exam  Abdominal:      Hernia: A hernia is present. Hernia is present in the left inguinal area. There is no hernia in the right inguinal area.   Genitourinary:     Prostate: Enlarged. Not tender and no nodules present.      Rectum: No external hemorrhoid.          PSA   Date Value Ref Range Status   06/16/2025 3.42 0.00 - 4.00 ng/mL Final     Comment:     This Roche electrochemiluminescent immunoassay is performed  on the Marge e immunoassay analyzer. Results obtained  with different test methods or kits must not be used  interchangeably.     06/17/2024 3.16 0.00 - 4.00 ng/mL Final     Comment:     This Roche electrochemiluminescent immunoassay is performed  on the Marge e immunoassay analyzer. Results obtained  with different test methods or kits must not be used  interchangeably.     06/05/2023 2.58 0.00 - 4.00 ng/mL Final   01/16/2023 3.99 0.00 - 4.00 ng/mL Final   04/07/2017 1.60 0.00 - 3.89 ng/mL Final     Comment:     When the Total PSA is between 3.00 and 10.00 ng/mL, consider  requesting a Free PSA to aid in diagnosis.             Assessment:       This is a 58 yo male with h/o OA/DDD,

## 2025-08-07 ENCOUNTER — OFFICE VISIT (OUTPATIENT)
Age: 60
End: 2025-08-07
Payer: COMMERCIAL

## 2025-08-07 VITALS
BODY MASS INDEX: 26.95 KG/M2 | WEIGHT: 210 LBS | HEIGHT: 74 IN | OXYGEN SATURATION: 97 % | TEMPERATURE: 97.4 F | HEART RATE: 79 BPM

## 2025-08-07 DIAGNOSIS — K40.90 NON-RECURRENT UNILATERAL INGUINAL HERNIA WITHOUT OBSTRUCTION OR GANGRENE: Primary | ICD-10-CM

## 2025-08-07 PROCEDURE — 99215 OFFICE O/P EST HI 40 MIN: CPT | Performed by: SURGERY

## (undated) DEVICE — 2000CC GUARDIAN II: Brand: GUARDIAN

## (undated) DEVICE — SKIN MARKER,REGULAR TIP WITH RULER: Brand: DEVON

## (undated) DEVICE — 3M™ COBAN™ STERILE SELF-ADHERENT WRAP, 1584S, 4 IN X 5 YD (10 CM X 4,5 M), 18 ROLLS/CASE: Brand: 3M™ COBAN™

## (undated) DEVICE — SUTURE VCRL + SZ 4-0 L18IN ABSRB UD L19MM PS-2 3/8 CIR PRIM VCP496H

## (undated) DEVICE — GLOVE ORANGE PI 7 1/2   MSG9075

## (undated) DEVICE — UNDERCAST PADDING: Brand: DEROYAL

## (undated) DEVICE — CHLORAPREP 26ML ORANGE

## (undated) DEVICE — DRESSING PETRO W3XL8IN N ADH KNIT CELOS ACETT ADPTC

## (undated) DEVICE — BANDAGE COMPR W4XL108IN WHT LAYERED NO CLSR SYN RUB ESMARCH

## (undated) DEVICE — SPLINT CAST 4INX15FT ORTHO GLS

## (undated) DEVICE — BANDAGE COMPR SGL LAYERED CLP CLSR E 33FT LEN 4IN W TETRA

## (undated) DEVICE — ELECTRODE PT RET AD L9FT HI MOIST COND ADH HYDRGEL CORDED

## (undated) DEVICE — SYRINGE BLB 50CC IRRIG PLIABLE FNGR FLNG GRAD FLSK DISP

## (undated) DEVICE — PAD,ABDOMINAL,8"X10",ST,LF: Brand: MEDLINE

## (undated) DEVICE — MAT FLR SURG QUICKWICK 28X54 IN DISP

## (undated) DEVICE — GAUZE SPONGES,12 PLY: Brand: CURITY

## (undated) DEVICE — GOWN,AURORA,NONREINFORCED,LARGE: Brand: MEDLINE

## (undated) DEVICE — SUTURE VCRL SZ 2-0 L36IN ABSRB UD L36MM CT-1 1/2 CIR J945H

## (undated) DEVICE — GLOVE ORANGE PI 8   MSG9080

## (undated) DEVICE — SUTURE FIBERWIRE SZ 2 W/ TAPERED NEEDLE BLUE L38IN NONABSORB BLU L26.5MM 1/2 CIRCLE AR7200

## (undated) DEVICE — MEDI-VAC NON-CONDUCTIVE SUCTION TUBING: Brand: CARDINAL HEALTH

## (undated) DEVICE — CONVERTED USE 291618 SPONGE LAPAROTOMY POCKET POUCH RING 18

## (undated) DEVICE — SUTURE ETHLN SZ 3-0 L18IN NONABSORBABLE BLK PS-2 L19MM 3/8 1669H

## (undated) DEVICE — 1842 FOAM BLOCK NEEDLE COUNTER: Brand: DEVON

## (undated) DEVICE — 3M™ STERI-DRAPE™ U-DRAPE 1015: Brand: STERI-DRAPE™

## (undated) DEVICE — PENCIL ES L3M BTTN SWCH HOLSTER W/ BLDE ELECTRD EDGE

## (undated) DEVICE — INTENDED FOR TISSUE SEPARATION, AND OTHER PROCEDURES THAT REQUIRE A SHARP SURGICAL BLADE TO PUNCTURE OR CUT.: Brand: BARD-PARKER ® CARBON RIB-BACK BLADES

## (undated) DEVICE — SMARTGOWN BREATHABLE SPECIALTY GOWN: Brand: CONVERTORS

## (undated) DEVICE — PACK ARTHRO III ST SIRUS